# Patient Record
Sex: FEMALE | Race: WHITE | Employment: OTHER | URBAN - NONMETROPOLITAN AREA
[De-identification: names, ages, dates, MRNs, and addresses within clinical notes are randomized per-mention and may not be internally consistent; named-entity substitution may affect disease eponyms.]

---

## 2017-05-01 RX ORDER — OXYBUTYNIN CHLORIDE 5 MG/1
TABLET ORAL
Qty: 60 TABLET | Refills: 5 | Status: SHIPPED | OUTPATIENT
Start: 2017-05-01

## 2017-07-25 ENCOUNTER — OFFICE VISIT (OUTPATIENT)
Dept: PRIMARY CARE CLINIC | Age: 82
End: 2017-07-25
Payer: MEDICARE

## 2017-07-25 DIAGNOSIS — R44.1 VISUAL HALLUCINATIONS: ICD-10-CM

## 2017-07-25 DIAGNOSIS — H35.52 PIGMENTARY RETINAL DYSTROPHY: ICD-10-CM

## 2017-07-25 DIAGNOSIS — I10 ESSENTIAL HYPERTENSION: Primary | ICD-10-CM

## 2017-07-25 PROCEDURE — 99308 SBSQ NF CARE LOW MDM 20: CPT | Performed by: FAMILY MEDICINE

## 2017-07-30 PROBLEM — R44.1 VISUAL HALLUCINATIONS: Status: ACTIVE | Noted: 2017-07-30

## 2017-07-30 PROBLEM — I10 ESSENTIAL HYPERTENSION: Status: ACTIVE | Noted: 2017-07-30

## 2017-07-30 PROBLEM — H35.52 PIGMENTARY RETINAL DYSTROPHY: Status: ACTIVE | Noted: 2017-07-30

## 2017-07-30 PROBLEM — R44.1 VISUAL HALLUCINATION: Status: ACTIVE | Noted: 2017-07-30

## 2017-07-30 ASSESSMENT — ENCOUNTER SYMPTOMS
RESPIRATORY NEGATIVE: 1
GASTROINTESTINAL NEGATIVE: 1

## 2017-11-27 RX ORDER — QUETIAPINE FUMARATE 25 MG/1
TABLET, FILM COATED ORAL
Qty: 15 TABLET | Refills: 5 | Status: SHIPPED | OUTPATIENT
Start: 2017-11-27

## 2018-01-05 RX ORDER — GABAPENTIN 300 MG/1
CAPSULE ORAL
Qty: 90 CAPSULE | Refills: 1 | Status: SHIPPED | OUTPATIENT
Start: 2018-01-05 | End: 2018-03-06 | Stop reason: SDUPTHER

## 2018-02-05 RX ORDER — MULTIVITAMIN/IRON/FOLIC ACID 18MG-0.4MG
TABLET ORAL
Qty: 30 TABLET | Refills: 5 | Status: SHIPPED | OUTPATIENT
Start: 2018-02-05

## 2018-02-06 ENCOUNTER — TELEPHONE (OUTPATIENT)
Dept: UROLOGY | Facility: CLINIC | Age: 83
End: 2018-02-06

## 2018-02-19 RX ORDER — LISINOPRIL 20 MG/1
TABLET ORAL
Qty: 30 TABLET | Refills: 1 | Status: SHIPPED | OUTPATIENT
Start: 2018-02-19 | End: 2020-01-20

## 2018-03-22 ENCOUNTER — LAB REQUISITION (OUTPATIENT)
Dept: LAB | Facility: HOSPITAL | Age: 83
End: 2018-03-22

## 2018-03-22 DIAGNOSIS — Z00.00 ROUTINE GENERAL MEDICAL EXAMINATION AT A HEALTH CARE FACILITY: ICD-10-CM

## 2018-03-22 LAB
FLUAV AG NPH QL: NEGATIVE
FLUBV AG NPH QL IA: NEGATIVE

## 2018-03-22 PROCEDURE — 87804 INFLUENZA ASSAY W/OPTIC: CPT

## 2019-01-07 RX ORDER — MIRABEGRON 50 MG/1
TABLET, FILM COATED, EXTENDED RELEASE ORAL
Qty: 30 TABLET | Refills: 2 | Status: SHIPPED | OUTPATIENT
Start: 2019-01-07

## 2019-03-06 ENCOUNTER — TELEPHONE (OUTPATIENT)
Dept: PRIMARY CARE CLINIC | Age: 84
End: 2019-03-06

## 2019-06-24 RX ORDER — ESOMEPRAZOLE MAGNESIUM 40 MG/1
CAPSULE, DELAYED RELEASE ORAL
Qty: 30 CAPSULE | Refills: 3 | Status: SHIPPED | OUTPATIENT
Start: 2019-06-24

## 2019-11-08 ENCOUNTER — TELEPHONE (OUTPATIENT)
Dept: PRIMARY CARE CLINIC | Age: 84
End: 2019-11-08

## 2019-11-08 RX ORDER — BENZONATATE 100 MG/1
100 CAPSULE ORAL 3 TIMES DAILY PRN
Qty: 30 CAPSULE | Refills: 0 | Status: SHIPPED | OUTPATIENT
Start: 2019-11-08 | End: 2019-11-15

## 2020-01-06 RX ORDER — SENNOSIDES 8.6 MG
TABLET ORAL
Qty: 30 TABLET | Refills: 5 | Status: SHIPPED | OUTPATIENT
Start: 2020-01-06

## 2020-01-20 RX ORDER — LISINOPRIL 20 MG/1
TABLET ORAL
Qty: 30 TABLET | Refills: 1 | Status: SHIPPED | OUTPATIENT
Start: 2020-01-20

## 2020-02-13 ENCOUNTER — HOSPITAL ENCOUNTER (OUTPATIENT)
Dept: CT IMAGING | Age: 85
Discharge: HOME | End: 2020-02-13
Payer: MEDICARE

## 2020-02-13 LAB
GFR NON-AFRICAN AMERICAN: 59
PERFORMED ON: ABNORMAL
POC CREATININE: 0.9 MG/DL (ref 0.3–1.3)
POC SAMPLE TYPE: ABNORMAL

## 2020-02-13 PROCEDURE — 82565 ASSAY OF CREATININE: CPT

## 2020-02-13 PROCEDURE — 6360000004 HC RX CONTRAST MEDICATION: Performed by: FAMILY MEDICINE

## 2020-02-13 PROCEDURE — 74160 CT ABDOMEN W/CONTRAST: CPT

## 2020-02-13 RX ADMIN — IOPAMIDOL 90 ML: 755 INJECTION, SOLUTION INTRAVENOUS at 12:09

## 2020-02-19 ENCOUNTER — TELEPHONE (OUTPATIENT)
Dept: PRIMARY CARE CLINIC | Age: 85
End: 2020-02-19

## 2020-05-19 ENCOUNTER — APPOINTMENT (OUTPATIENT)
Dept: CT IMAGING | Facility: HOSPITAL | Age: 85
End: 2020-05-19

## 2020-05-19 ENCOUNTER — HOSPITAL ENCOUNTER (INPATIENT)
Facility: HOSPITAL | Age: 85
LOS: 3 days | Discharge: NURSING, REHAB OR LONG-TERM CARE | End: 2020-05-22
Attending: EMERGENCY MEDICINE | Admitting: INTERNAL MEDICINE

## 2020-05-19 DIAGNOSIS — N17.9 AKI (ACUTE KIDNEY INJURY) (HCC): ICD-10-CM

## 2020-05-19 DIAGNOSIS — R55 NEAR SYNCOPE: Primary | ICD-10-CM

## 2020-05-19 DIAGNOSIS — K62.89 PROCTITIS: ICD-10-CM

## 2020-05-19 DIAGNOSIS — D64.9 ANEMIA, UNSPECIFIED TYPE: ICD-10-CM

## 2020-05-19 DIAGNOSIS — N39.0 ACUTE UTI (URINARY TRACT INFECTION): ICD-10-CM

## 2020-05-19 PROBLEM — E87.20 LACTIC ACIDOSIS: Status: ACTIVE | Noted: 2020-05-19

## 2020-05-19 PROBLEM — A49.9 BACTERIAL UTI: Status: ACTIVE | Noted: 2020-05-19

## 2020-05-19 PROBLEM — R11.2 NAUSEA WITH VOMITING: Status: ACTIVE | Noted: 2020-05-19

## 2020-05-19 PROBLEM — E86.9 VOLUME DEPLETION: Status: ACTIVE | Noted: 2020-05-19

## 2020-05-19 PROBLEM — I10 ESSENTIAL HYPERTENSION: Status: ACTIVE | Noted: 2020-05-19

## 2020-05-19 PROBLEM — H54.8 LEGAL BLINDNESS: Status: ACTIVE | Noted: 2020-05-19

## 2020-05-19 PROBLEM — F03.90 DEMENTIA (HCC): Status: ACTIVE | Noted: 2020-05-19

## 2020-05-19 LAB
ALBUMIN SERPL-MCNC: 3.8 G/DL (ref 3.5–5.2)
ALBUMIN/GLOB SERPL: 1.2 G/DL
ALP SERPL-CCNC: 72 U/L (ref 39–117)
ALT SERPL W P-5'-P-CCNC: 7 U/L (ref 1–33)
ANION GAP SERPL CALCULATED.3IONS-SCNC: 14 MMOL/L (ref 5–15)
AST SERPL-CCNC: 15 U/L (ref 1–32)
BACTERIA UR QL AUTO: ABNORMAL /HPF
BASOPHILS # BLD AUTO: 0.02 10*3/MM3 (ref 0–0.2)
BASOPHILS NFR BLD AUTO: 0.3 % (ref 0–1.5)
BILIRUB SERPL-MCNC: 0.3 MG/DL (ref 0.2–1.2)
BILIRUB UR QL STRIP: NEGATIVE
BUN BLD-MCNC: 38 MG/DL (ref 8–23)
BUN/CREAT SERPL: 23.5 (ref 7–25)
CALCIUM SPEC-SCNC: 9 MG/DL (ref 8.2–9.6)
CHLORIDE SERPL-SCNC: 100 MMOL/L (ref 98–107)
CLARITY UR: CLEAR
CO2 SERPL-SCNC: 21 MMOL/L (ref 22–29)
COLOR UR: YELLOW
CREAT BLD-MCNC: 1.62 MG/DL (ref 0.57–1)
D-LACTATE SERPL-SCNC: 1.4 MMOL/L (ref 0.5–2)
D-LACTATE SERPL-SCNC: 2.1 MMOL/L (ref 0.5–2)
DEPRECATED RDW RBC AUTO: 54 FL (ref 37–54)
EOSINOPHIL # BLD AUTO: 0.17 10*3/MM3 (ref 0–0.4)
EOSINOPHIL NFR BLD AUTO: 2.9 % (ref 0.3–6.2)
ERYTHROCYTE [DISTWIDTH] IN BLOOD BY AUTOMATED COUNT: 15.7 % (ref 12.3–15.4)
FERRITIN SERPL-MCNC: 179.3 NG/ML (ref 13–150)
GFR SERPL CREATININE-BSD FRML MDRD: 30 ML/MIN/1.73
GLOBULIN UR ELPH-MCNC: 3.3 GM/DL
GLUCOSE BLD-MCNC: 118 MG/DL (ref 65–99)
GLUCOSE UR STRIP-MCNC: NEGATIVE MG/DL
HCT VFR BLD AUTO: 27.5 % (ref 34–46.6)
HGB BLD-MCNC: 9.1 G/DL (ref 12–15.9)
HGB UR QL STRIP.AUTO: ABNORMAL
HOLD SPECIMEN: NORMAL
HYALINE CASTS UR QL AUTO: ABNORMAL /LPF
IMM GRANULOCYTES # BLD AUTO: 0.02 10*3/MM3 (ref 0–0.05)
IMM GRANULOCYTES NFR BLD AUTO: 0.3 % (ref 0–0.5)
IRON 24H UR-MRATE: 61 MCG/DL (ref 37–145)
IRON SATN MFR SERPL: 22 % (ref 20–50)
KETONES UR QL STRIP: NEGATIVE
LACTATE HOLD SPECIMEN: NORMAL
LEUKOCYTE ESTERASE UR QL STRIP.AUTO: ABNORMAL
LIPASE SERPL-CCNC: 53 U/L (ref 13–60)
LYMPHOCYTES # BLD AUTO: 1.26 10*3/MM3 (ref 0.7–3.1)
LYMPHOCYTES NFR BLD AUTO: 21.6 % (ref 19.6–45.3)
MCH RBC QN AUTO: 30.8 PG (ref 26.6–33)
MCHC RBC AUTO-ENTMCNC: 33.1 G/DL (ref 31.5–35.7)
MCV RBC AUTO: 93.2 FL (ref 79–97)
MONOCYTES # BLD AUTO: 0.49 10*3/MM3 (ref 0.1–0.9)
MONOCYTES NFR BLD AUTO: 8.4 % (ref 5–12)
NEUTROPHILS # BLD AUTO: 3.88 10*3/MM3 (ref 1.7–7)
NEUTROPHILS NFR BLD AUTO: 66.5 % (ref 42.7–76)
NITRITE UR QL STRIP: NEGATIVE
NRBC BLD AUTO-RTO: 0 /100 WBC (ref 0–0.2)
PH UR STRIP.AUTO: 6 [PH] (ref 5–8)
PLATELET # BLD AUTO: 202 10*3/MM3 (ref 140–450)
PMV BLD AUTO: 11 FL (ref 6–12)
POTASSIUM BLD-SCNC: 4 MMOL/L (ref 3.5–5.2)
PROCALCITONIN SERPL-MCNC: 0.07 NG/ML (ref 0.1–0.25)
PROT SERPL-MCNC: 7.1 G/DL (ref 6–8.5)
PROT UR QL STRIP: NEGATIVE
RBC # BLD AUTO: 2.95 10*6/MM3 (ref 3.77–5.28)
RBC # UR: ABNORMAL /HPF
REF LAB TEST METHOD: ABNORMAL
SODIUM BLD-SCNC: 135 MMOL/L (ref 136–145)
SP GR UR STRIP: 1.01 (ref 1–1.03)
SQUAMOUS #/AREA URNS HPF: ABNORMAL /HPF
TIBC SERPL-MCNC: 273 MCG/DL (ref 298–536)
TRANSFERRIN SERPL-MCNC: 183 MG/DL (ref 200–360)
TROPONIN T SERPL-MCNC: <0.01 NG/ML (ref 0–0.03)
UROBILINOGEN UR QL STRIP: ABNORMAL
WBC NRBC COR # BLD: 5.84 10*3/MM3 (ref 3.4–10.8)
WBC UR QL AUTO: ABNORMAL /HPF
WHOLE BLOOD HOLD SPECIMEN: NORMAL
WHOLE BLOOD HOLD SPECIMEN: NORMAL

## 2020-05-19 PROCEDURE — 84484 ASSAY OF TROPONIN QUANT: CPT | Performed by: EMERGENCY MEDICINE

## 2020-05-19 PROCEDURE — 84466 ASSAY OF TRANSFERRIN: CPT | Performed by: INTERNAL MEDICINE

## 2020-05-19 PROCEDURE — 82607 VITAMIN B-12: CPT | Performed by: INTERNAL MEDICINE

## 2020-05-19 PROCEDURE — 83690 ASSAY OF LIPASE: CPT | Performed by: EMERGENCY MEDICINE

## 2020-05-19 PROCEDURE — 83540 ASSAY OF IRON: CPT | Performed by: INTERNAL MEDICINE

## 2020-05-19 PROCEDURE — 83605 ASSAY OF LACTIC ACID: CPT | Performed by: EMERGENCY MEDICINE

## 2020-05-19 PROCEDURE — 99284 EMERGENCY DEPT VISIT MOD MDM: CPT

## 2020-05-19 PROCEDURE — 87040 BLOOD CULTURE FOR BACTERIA: CPT | Performed by: EMERGENCY MEDICINE

## 2020-05-19 PROCEDURE — 87147 CULTURE TYPE IMMUNOLOGIC: CPT | Performed by: EMERGENCY MEDICINE

## 2020-05-19 PROCEDURE — P9612 CATHETERIZE FOR URINE SPEC: HCPCS

## 2020-05-19 PROCEDURE — 85025 COMPLETE CBC W/AUTO DIFF WBC: CPT | Performed by: EMERGENCY MEDICINE

## 2020-05-19 PROCEDURE — 74176 CT ABD & PELVIS W/O CONTRAST: CPT

## 2020-05-19 PROCEDURE — 25010000002 ERTAPENEM PER 500 MG: Performed by: EMERGENCY MEDICINE

## 2020-05-19 PROCEDURE — 82728 ASSAY OF FERRITIN: CPT | Performed by: INTERNAL MEDICINE

## 2020-05-19 PROCEDURE — 80053 COMPREHEN METABOLIC PANEL: CPT | Performed by: EMERGENCY MEDICINE

## 2020-05-19 PROCEDURE — 93005 ELECTROCARDIOGRAM TRACING: CPT | Performed by: EMERGENCY MEDICINE

## 2020-05-19 PROCEDURE — 87086 URINE CULTURE/COLONY COUNT: CPT | Performed by: EMERGENCY MEDICINE

## 2020-05-19 PROCEDURE — 84145 PROCALCITONIN (PCT): CPT | Performed by: EMERGENCY MEDICINE

## 2020-05-19 PROCEDURE — 93010 ELECTROCARDIOGRAM REPORT: CPT | Performed by: INTERNAL MEDICINE

## 2020-05-19 PROCEDURE — 81001 URINALYSIS AUTO W/SCOPE: CPT | Performed by: EMERGENCY MEDICINE

## 2020-05-19 RX ORDER — ONDANSETRON 2 MG/ML
4 INJECTION INTRAMUSCULAR; INTRAVENOUS EVERY 6 HOURS PRN
Status: DISCONTINUED | OUTPATIENT
Start: 2020-05-19 | End: 2020-05-22 | Stop reason: HOSPADM

## 2020-05-19 RX ORDER — SODIUM CHLORIDE 0.9 % (FLUSH) 0.9 %
10 SYRINGE (ML) INJECTION EVERY 12 HOURS SCHEDULED
Status: DISCONTINUED | OUTPATIENT
Start: 2020-05-19 | End: 2020-05-22 | Stop reason: HOSPADM

## 2020-05-19 RX ORDER — METOPROLOL SUCCINATE 100 MG/1
100 TABLET, EXTENDED RELEASE ORAL DAILY
Status: DISCONTINUED | OUTPATIENT
Start: 2020-05-19 | End: 2020-05-22 | Stop reason: HOSPADM

## 2020-05-19 RX ORDER — OXYBUTYNIN CHLORIDE 5 MG/1
10 TABLET, EXTENDED RELEASE ORAL DAILY
Status: DISCONTINUED | OUTPATIENT
Start: 2020-05-19 | End: 2020-05-22 | Stop reason: HOSPADM

## 2020-05-19 RX ORDER — METOPROLOL SUCCINATE 100 MG/1
100 TABLET, EXTENDED RELEASE ORAL DAILY
COMMUNITY

## 2020-05-19 RX ORDER — ATORVASTATIN CALCIUM 10 MG/1
10 TABLET, FILM COATED ORAL DAILY
COMMUNITY

## 2020-05-19 RX ORDER — ACETAMINOPHEN 650 MG/1
650 SUPPOSITORY RECTAL EVERY 4 HOURS PRN
Status: DISCONTINUED | OUTPATIENT
Start: 2020-05-19 | End: 2020-05-22 | Stop reason: HOSPADM

## 2020-05-19 RX ORDER — SODIUM CHLORIDE 9 MG/ML
50 INJECTION, SOLUTION INTRAVENOUS CONTINUOUS
Status: DISCONTINUED | OUTPATIENT
Start: 2020-05-19 | End: 2020-05-21

## 2020-05-19 RX ORDER — ATORVASTATIN CALCIUM 10 MG/1
10 TABLET, FILM COATED ORAL NIGHTLY
Status: DISCONTINUED | OUTPATIENT
Start: 2020-05-19 | End: 2020-05-22 | Stop reason: HOSPADM

## 2020-05-19 RX ORDER — AMINO ACIDS/PROTEIN HYDROLYS 15G-100/30
30 LIQUID (ML) ORAL 2 TIMES DAILY
COMMUNITY

## 2020-05-19 RX ORDER — PANTOPRAZOLE SODIUM 40 MG/1
40 TABLET, DELAYED RELEASE ORAL EVERY MORNING
Status: DISCONTINUED | OUTPATIENT
Start: 2020-05-20 | End: 2020-05-22 | Stop reason: HOSPADM

## 2020-05-19 RX ORDER — ACETAMINOPHEN 160 MG/5ML
650 SOLUTION ORAL EVERY 4 HOURS PRN
Status: DISCONTINUED | OUTPATIENT
Start: 2020-05-19 | End: 2020-05-22 | Stop reason: HOSPADM

## 2020-05-19 RX ORDER — HYDROCHLOROTHIAZIDE 12.5 MG/1
12.5 CAPSULE, GELATIN COATED ORAL DAILY
COMMUNITY
End: 2020-05-22 | Stop reason: HOSPADM

## 2020-05-19 RX ORDER — LISINOPRIL 20 MG/1
20 TABLET ORAL DAILY
COMMUNITY

## 2020-05-19 RX ORDER — SODIUM CHLORIDE 0.9 % (FLUSH) 0.9 %
10 SYRINGE (ML) INJECTION AS NEEDED
Status: DISCONTINUED | OUTPATIENT
Start: 2020-05-19 | End: 2020-05-22 | Stop reason: HOSPADM

## 2020-05-19 RX ORDER — ACETAMINOPHEN 325 MG/1
650 TABLET ORAL EVERY 4 HOURS PRN
Status: DISCONTINUED | OUTPATIENT
Start: 2020-05-19 | End: 2020-05-22 | Stop reason: HOSPADM

## 2020-05-19 RX ORDER — QUETIAPINE FUMARATE 25 MG/1
25 TABLET, FILM COATED ORAL 3 TIMES DAILY
COMMUNITY

## 2020-05-19 RX ORDER — QUETIAPINE FUMARATE 25 MG/1
25 TABLET, FILM COATED ORAL 3 TIMES DAILY
Status: DISCONTINUED | OUTPATIENT
Start: 2020-05-19 | End: 2020-05-22 | Stop reason: HOSPADM

## 2020-05-19 RX ADMIN — SODIUM CHLORIDE 100 ML/HR: 9 INJECTION, SOLUTION INTRAVENOUS at 16:49

## 2020-05-19 RX ADMIN — POLYETHYLENE GLYCOL (3350) 17 G: 17 POWDER, FOR SOLUTION ORAL at 17:57

## 2020-05-19 RX ADMIN — ATORVASTATIN CALCIUM 10 MG: 10 TABLET, FILM COATED ORAL at 22:40

## 2020-05-19 RX ADMIN — OXYBUTYNIN CHLORIDE 10 MG: 5 TABLET, EXTENDED RELEASE ORAL at 16:49

## 2020-05-19 RX ADMIN — SODIUM CHLORIDE, POTASSIUM CHLORIDE, SODIUM LACTATE AND CALCIUM CHLORIDE 500 ML: 600; 310; 30; 20 INJECTION, SOLUTION INTRAVENOUS at 12:10

## 2020-05-19 RX ADMIN — SODIUM CHLORIDE 1 G: 900 INJECTION INTRAVENOUS at 13:26

## 2020-05-19 RX ADMIN — QUETIAPINE FUMARATE 25 MG: 25 TABLET ORAL at 22:40

## 2020-05-19 RX ADMIN — QUETIAPINE FUMARATE 25 MG: 25 TABLET ORAL at 16:50

## 2020-05-19 RX ADMIN — METOPROLOL SUCCINATE 100 MG: 100 TABLET, EXTENDED RELEASE ORAL at 16:49

## 2020-05-19 NOTE — ED PROVIDER NOTES
Subjective   Patient was in a nursing home there is a confusion that she had a syncopal episode did not fall down questionable CPR EMS as there was no CPR patient is confused she is legally blind has abdominal pain and tenderness      Nausea   The primary symptoms include nausea and vomiting. Primary symptoms do not include fever, weight loss, fatigue, diarrhea, jaundice, hematochezia, myalgias or arthralgias. The illness began 2 days ago. The onset was gradual.   The illness does not include chills or back pain. Associated medical issues do not include gallstones, liver disease or irritable bowel syndrome.   Vomiting   The primary symptoms include nausea and vomiting. Primary symptoms do not include fever, weight loss, fatigue, diarrhea, jaundice, hematochezia, myalgias or arthralgias.   The illness does not include chills or back pain. Associated medical issues do not include gallstones, liver disease or irritable bowel syndrome.       Review of Systems   Unable to perform ROS: Dementia   Constitutional: Negative.  Negative for chills, fatigue, fever and weight loss.   HENT: Negative.    Eyes: Negative.    Respiratory: Negative.    Cardiovascular: Negative.    Gastrointestinal: Positive for nausea and vomiting. Negative for diarrhea, hematochezia and jaundice.   Musculoskeletal: Negative.  Negative for arthralgias, back pain, myalgias and neck pain.   Skin: Negative.    Neurological: Negative.    All other systems reviewed and are negative.      No past medical history on file.    Allergies   Allergen Reactions   • Ampicillin Unknown - High Severity       Past Surgical History:   Procedure Laterality Date   • BREAST BIOPSY Right 1970   • HYSTERECTOMY         Family History   Problem Relation Age of Onset   • Breast cancer Maternal Aunt        Social History     Socioeconomic History   • Marital status:      Spouse name: Not on file   • Number of children: Not on file   • Years of education: Not on file   •  Highest education level: Not on file           Objective   Physical Exam   Constitutional: She appears well-developed and well-nourished.   HENT:   Head: Normocephalic and atraumatic.   Eyes: Pupils are equal, round, and reactive to light. EOM are normal.   Neck: Normal range of motion. Neck supple.   Cardiovascular: Normal rate and regular rhythm.   Pulmonary/Chest: Effort normal and breath sounds normal.   Abdominal: Soft. Bowel sounds are normal. There is tenderness.   Musculoskeletal: Normal range of motion.   Neurological: She is alert. She has normal reflexes. She is disoriented.   She is neuro exam is difficult to certain she is legally blind and has dementia does not follow commands has got flexion contractures lower extremities.  Is disoriented could be baseline   Skin: Skin is warm and dry.   Pale and clammy skin   Psychiatric: She has a normal mood and affect.   Nursing note and vitals reviewed.      Procedures           ED Course  ED Course as of May 19 1352   Tue May 19, 2020   1331 This patient came in with nausea and vomiting there is no history of passing out or getting CPR she did felt like she was going to faint per the people on site and she was laid down on the couch.  Her work-up essentially is negative except for proctitis and renal insufficiency she does have a UTI    [TS]   1332 Hemoglobin is on the lower side the nurses were able to discuss this case with her daughter as far as mental status is concerned this is baseline.  Her bicarb is low probably is RTA    [TS]   1344 Patient with vomiting abdominal discomfort has got proctitis renal insufficiency with new onset anemia and a UTI has received IV antibiotics will be admitted to the hospitalist service    [TS]   1347 Comprehensive Metabolic Panel(!) [TS]      ED Course User Index  [TS] Neal Morris MD                                           [facility]  Number of Diagnoses or Management Options  Diagnosis management comments: Patient with  abdominal discomfort tenderness on palpation nausea and vomiting also been near syncopal episode       Amount and/or Complexity of Data Reviewed  Clinical lab tests: ordered and reviewed  Tests in the radiology section of CPT®: ordered and reviewed  Tests in the medicine section of CPT®: reviewed and ordered    Risk of Complications, Morbidity, and/or Mortality  Presenting problems: moderate  Diagnostic procedures: moderate  Management options: moderate        Final diagnoses:   Near syncope   Acute UTI (urinary tract infection)   JAZZMINE (acute kidney injury) (CMS/[facility])   Anemia, unspecified type   Proctitis            Neal Morris MD  05/19/20 7487

## 2020-05-19 NOTE — H&P
"    [facility] Medicine Services  HISTORY AND PHYSICAL    Date of Admission: 5/19/2020  Primary Care Physician: Nunu Finch MD    Subjective     Chief Complaint: Weakness, near syncope.    History of Present Illness  This is a 89-year-old  female patient who resides at [facility].  Her primary care physician is Dr. Nunu Finch.  She was referred to the emergency department this morning after having an episode of weakness and a near syncopal episode at the skilled nursing facility.  She apparently has also been complaining of some nausea/vomiting and generalized abdominal pain.  She states that the near syncopal episode was yesterday and denies any problems with nausea/vomiting or abdominal pain.    Work-up in the emergency department finds her to have possible proctitis versus stercoral colitis.  She also has evidence of urinary tract infection.  She is in acute renal failure based on previous labs.  She is being admitted for IV antibiotics and hydration with close monitoring.    She has a history of dementia with behavioral disturbance on Seroquel.  She is also legally blind.    Her review of systems is very difficult.  She seems to want to be left alone. She tells me that she has \"answer none of questions today.\"  She wants something to drink.    Review of Systems   Difficult with her dementia.    Past Medical History:   Past Medical History:   Diagnosis Date   • Hyperlipidemia    • Hypertension      Past Surgical History:  Past Surgical History:   Procedure Laterality Date   • BREAST BIOPSY Right 1970   • HYSTERECTOMY       Social History: Lives at a skilled nursing facility in [place].  Does not smoke or drink alcohol.    Family History: family history includes Breast cancer in her maternal aunt.     Allergies:  Allergies   Allergen Reactions   • Ampicillin Unknown - High Severity     Medications:  Prior to Admission medications    " "  Medication Sig Start Date End Date Taking? Authorizing Provider   atorvastatin (LIPITOR) 10 MG tablet Take 10 mg by mouth Daily.   Yes Sergio Damon MD   Cranberry-Vitamin C-Inulin (UTI-STAT) liquid Take 30 mL by mouth 2 (Two) Times a Day.   Yes Sergio Damon MD   esomeprazole (nexIUM) 20 MG capsule Take 20 mg by mouth Every Morning Before Breakfast.   Yes Sergio Damon MD   hydroCHLOROthiazide (MICROZIDE) 12.5 MG capsule Take 12.5 mg by mouth Daily.   Yes Sergio Damon MD   lisinopril (PRINIVIL,ZESTRIL) 20 MG tablet Take 20 mg by mouth Daily.   Yes Sergio Damon MD   metoprolol succinate XL (TOPROL-XL) 100 MG 24 hr tablet Take 100 mg by mouth Daily.   Yes Sergio Damon MD   Mirabegron ER (MYRBETRIQ) 50 MG tablet sustained-release 24 hour 24 hr tablet Take 50 mg by mouth Daily.   Yes Sergio Damon MD   QUEtiapine (SEROquel) 25 MG tablet Take 25 mg by mouth 3 (Three) Times a Day.   Yes Sergio Damon MD     Objective     Vital Signs: /56   Pulse 59   Temp 97.5 °F (36.4 °C) (Oral)   Resp 16   Ht 157.5 cm (62\")   Wt 67.1 kg (148 lb)   SpO2 96%   BMI 27.07 kg/m²   Physical Exam   Constitutional: She appears well-developed and well-nourished.   Up in bed.  No distress.  Nontoxic.  No family present.  Discussed with her nurse, Claire.   HENT:   Head: Normocephalic and atraumatic.   Eyes: Pupils are equal, round, and reactive to light. Conjunctivae are normal.   Neck: Neck supple. No JVD present.   Cardiovascular: Normal rate, regular rhythm, normal heart sounds and intact distal pulses. Exam reveals no gallop and no friction rub.   No murmur heard.  Pulmonary/Chest: Effort normal and breath sounds normal. No respiratory distress. She has no wheezes. She has no rales. She exhibits no tenderness.   Abdominal: Soft. Bowel sounds are normal. She exhibits no distension. There is no tenderness. There is no rebound and no guarding.   Musculoskeletal: " Normal range of motion. She exhibits no edema, tenderness or deformity.   Neurological: She is alert. She displays normal reflexes. No cranial nerve deficit. She exhibits normal muscle tone.   Oriented to self and situation only.  Generally weak, but nonfocal.   Skin: Skin is warm and dry. No rash noted.   Psychiatric:   Flat.  Does not want to see an answer questions.     Results Reviewed:  Lab Results (last 24 hours)     Procedure Component Value Units Date/Time    Blood Culture - Blood, Arm, Left [[id number]] Collected:  05/19/20 1125    Specimen:  Blood from Arm, Left Updated:  05/19/20 1335    Blood Culture - Blood, Arm, Right [[id number]] Collected:  05/19/20 1155    Specimen:  Blood from Arm, Right Updated:  05/19/20 1302    Urinalysis, Microscopic Only - Urine, Catheter In/Out [[id number]]  (Abnormal) Collected:  05/19/20 1158    Specimen:  Urine, Catheter In/Out Updated:  05/19/20 1236     RBC, UA 6-12 /HPF      WBC, UA 31-50 /HPF      Bacteria, UA 2+ /HPF      Squamous Epithelial Cells, UA 7-12 /HPF      Hyaline Casts, UA None Seen /LPF      Methodology Automated Microscopy    Urine Culture - Urine, Urine, Catheter In/Out [[id number]] Collected:  05/19/20 1158    Specimen:  Urine, Catheter In/Out Updated:  05/19/20 1236    [place] Draw [[id number]] Collected:  05/19/20 1125    Specimen:  Blood Updated:  05/19/20 1230    Narrative:       The following orders were created for panel order [place] Draw.  Procedure                               Abnormality         Status                     ---------                               -----------         ------                     Light Blue Top[[id number]]                                    Final result               Green Top (Gel)[[id number]]                                   Final result               Lavender Top[[id number]]                                      Final result               Red Top[[id number]]                                           Final result                  [facility] Blood Culture Bot...[[id number]]                      Final result               Gray Top - Ice[[id number]]                                    Final result                 Please view results for these tests on the individual orders.    Light Blue Top [[id number]] Collected:  05/19/20 1125    Specimen:  Blood Updated:  05/19/20 1230     Extra Tube hold for add-on     Comment: Auto resulted       Green Top (Gel) [[id number]] Collected:  05/19/20 1125    Specimen:  Blood Updated:  05/19/20 1230     Extra Tube Hold for add-ons.     Comment: Auto resulted.       Lavender Top [[id number]] Collected:  05/19/20 1125    Specimen:  Blood Updated:  05/19/20 1230     Extra Tube hold for add-on     Comment: Auto resulted       Red Top [[id number]] Collected:  05/19/20 1125    Specimen:  Blood Updated:  05/19/20 1230     Extra Tube Hold for add-ons.     Comment: Auto resulted.       [employer] Blood Culture Bottle Set [[id number]] Collected:  05/19/20 1125    Specimen:  Blood from Arm, Left Updated:  05/19/20 1230     Extra Tube Hold for add-ons.     Comment: Auto resulted.       Urinalysis With Culture If Indicated - Urine, Catheter In/Out [[id number]]  (Abnormal) Collected:  05/19/20 1158    Specimen:  Urine, Catheter In/Out Updated:  05/19/20 1219     Color, UA Yellow     Appearance, UA Clear     pH, UA 6.0     Specific Gravity, UA 1.015     Glucose, UA Negative     Ketones, UA Negative     Bilirubin, UA Negative     Blood, UA Moderate (2+)     Protein, UA Negative     Leuk Esterase, UA Large (3+)     Nitrite, UA Negative     Urobilinogen, UA 0.2 E.U./dL    Lactic Acid, Plasma [[id number]]  (Abnormal) Collected:  05/19/20 1125    Specimen:  Blood Updated:  05/19/20 1215     Lactate 2.1 mmol/L     Lactic Acid, Reflex Timer (This will reflex a repeat order 3-3:15 hours after ordered.) [[id number]] Collected:  05/19/20 1125    Specimen:  Blood Updated:  05/19/20 1215    Procalcitonin [[id number]]  (Abnormal) Collected:  05/19/20 1125     "Specimen:  Blood Updated:  05/19/20 1204     Procalcitonin 0.07 ng/mL     Narrative:       As a Marker for Sepsis (Non-Neonates):   1. <0.5 ng/mL represents a low risk of severe sepsis and/or septic shock.  1. >2 ng/mL represents a high risk of severe sepsis and/or septic shock.    As a Marker for Lower Respiratory Tract Infections that require antibiotic therapy:  PCT on Admission     Antibiotic Therapy             6-12 Hrs later  > 0.5                Strongly Recommended            >0.25 - <0.5         Recommended  0.1 - 0.25           Discouraged                   Remeasure/reassess PCT  <0.1                 Strongly Discouraged          Remeasure/reassess PCT      As 28 day mortality risk marker: \"Change in Procalcitonin Result\" (> 80 % or <=80 %) if Day 0 (or Day 1) and Day 4 values are available. Refer to http://www.Zondlepct-calculator.[employer]/   Change in PCT <=80 %   A decrease of PCT levels below or equal to 80 % defines a positive change in PCT test result representing a higher risk for 28-day all-cause mortality of patients diagnosed with severe sepsis or septic shock.  Change in PCT > 80 %   A decrease of PCT levels of more than 80 % defines a negative change in PCT result representing a lower risk for 28-day all-cause mortality of patients diagnosed with severe sepsis or septic shock.                Results may be falsely decreased if patient taking Biotin.     Comprehensive Metabolic Panel [[id number]]  (Abnormal) Collected:  05/19/20 1125    Specimen:  Blood Updated:  05/19/20 1201     Glucose 118 mg/dL      BUN 38 mg/dL      Creatinine 1.62 mg/dL      Sodium 135 mmol/L      Potassium 4.0 mmol/L      Chloride 100 mmol/L      CO2 21.0 mmol/L      Calcium 9.0 mg/dL      Total Protein 7.1 g/dL      Albumin 3.80 g/dL      ALT (SGPT) 7 U/L      AST (SGOT) 15 U/L      Alkaline Phosphatase 72 U/L      Total Bilirubin 0.3 mg/dL      eGFR Non African Amer 30 mL/min/1.73      Globulin 3.3 gm/dL      A/G Ratio 1.2 " g/dL      BUN/Creatinine Ratio 23.5     Anion Gap 14.0 mmol/L     Narrative:       GFR Normal >60  Chronic Kidney Disease <60  Kidney Failure <15      Gray Top - Ice [[id number]] Collected:  05/19/20 1125    Specimen:  Blood Updated:  05/19/20 1158     Extra Tube --    Lipase [[id number]]  (Normal) Collected:  05/19/20 1125    Specimen:  Blood Updated:  05/19/20 1156     Lipase 53 U/L     Troponin [[id number]]  (Normal) Collected:  05/19/20 1125    Specimen:  Blood Updated:  05/19/20 1156     Troponin T <0.010 ng/mL     Narrative:       Troponin T Reference Range:  <= 0.03 ng/mL-   Negative for AMI  >0.03 ng/mL-     Abnormal for myocardial necrosis.  Clinicians would have to utilize clinical acumen, EKG, Troponin and serial changes to determine if it is an Acute Myocardial Infarction or myocardial injury due to an underlying chronic condition.       Results may be falsely decreased if patient taking Biotin.      CBC & Differential [[id number]] Collected:  05/19/20 1125    Specimen:  Blood Updated:  05/19/20 1140    Narrative:       The following orders were created for panel order CBC & Differential.  Procedure                               Abnormality         Status                     ---------                               -----------         ------                     CBC Auto Differential[[id number]]        Abnormal            Final result                 Please view results for these tests on the individual orders.    CBC Auto Differential [[id number]]  (Abnormal) Collected:  05/19/20 1125    Specimen:  Blood Updated:  05/19/20 1140     WBC 5.84 10*3/mm3      RBC 2.95 10*6/mm3      Hemoglobin 9.1 g/dL      Hematocrit 27.5 %      MCV 93.2 fL      MCH 30.8 pg      MCHC 33.1 g/dL      RDW 15.7 %      RDW-SD 54.0 fl      MPV 11.0 fL      Platelets 202 10*3/mm3      Neutrophil % 66.5 %      Lymphocyte % 21.6 %      Monocyte % 8.4 %      Eosinophil % 2.9 %      Basophil % 0.3 %      Immature Grans % 0.3 %      Neutrophils,  Absolute 3.88 10*3/mm3      Lymphocytes, Absolute 1.26 10*3/mm3      Monocytes, Absolute 0.49 10*3/mm3      Eosinophils, Absolute 0.17 10*3/mm3      Basophils, Absolute 0.02 10*3/mm3      Immature Grans, Absolute 0.02 10*3/mm3      nRBC 0.0 /100 WBC         Imaging Results (Last 24 Hours)     Procedure Component Value Units Date/Time    CT Abdomen Pelvis Without Contrast [[id number]] Collected:  05/19/20 1216     Updated:  05/19/20 1227    Narrative:       CT ABDOMEN PELVIS WO CONTRAST- 5/19/2020 12:07 PM CDT     HISTORY: Abd pain, fever, abscess suspected      COMPARISON: None      DOSE LENGTH PRODUCT: 230 mGy cm. Automated exposure control was also  utilized to decrease patient radiation dose.     TECHNIQUE: Noncontrast enhanced images of the abdomen and pelvis  obtained without oral contrast. Multiplanar reformatted images were  provided for review.      FINDINGS:   LOWER CHEST: Mild scarring in both bases. No consolidation.      LIVER: No focal liver lesion within limits of a noncontrast study.      BILIARY SYSTEM: The gallbladder has been removed. There is no evidence  of biliary ductal dilation.      PANCREAS: Diffuse atrophy. No obvious focal lesion.      SPLEEN: Normal size.      KIDNEYS: Bilateral kidneys are grossly unremarkable. No hydronephrosis.  The ureters are decompressed and normal in appearance.     ADRENALS: Unremarkable.     RETROPERITONEUM: No mass, lymphadenopathy or hemorrhage.      GI TRACT: Stomach is unremarkable. The small bowel is nondilated. There  appears to be prior appendectomy. The colon is mostly decompressed.  Prominent rectal stool with mild distention. Notable circumferential  wall thickening at the rectum with adjacent inflammatory stranding in  the mesorectal fat. No extraluminal collection/abscess identified.  Underlying sigmoid diverticulosis.     OTHER: No intraperitoneal free fluid or free air. No mesenteric  adenopathy. Rectus diastases. Age indeterminant wedge  compression  deformities at L1 and L2.      PELVIS: No mass lesion, fluid collection or significant lymphadenopathy  is seen in the pelvis. The urinary bladder is normal in appearance.       Impression:       1. Proctitis vs stercoral colitis. Proctitis is favored, as the rectal  is only slightly distended. Associated inflammatory stranding in the  mesorectal fat. No visualized perforation or evidence of bowel  obstruction.  This report was finalized on 05/19/2020 12:24 by Dr Constantin Sanchez, .        I have personally reviewed and interpreted the radiology studies and ECG obtained at time of admission.     Assessment / Plan     Assessment:   Active Hospital Problems    Diagnosis   • **Near syncope   • Nausea with vomiting   • Volume depletion   • Lactic acidosis, slight (2.1)   • Acute renal failure (ARF) (CMS/HCC)   • Proctitis   • Bacterial UTI   • Essential hypertension   • Dementia (CMS/HCC)   • Legal blindness   • Normocytic anemia     Plan:   She will be admitted to my service here about self [place].  She presents to the emergency department today with complaints of a near syncopal episode, weakness, nausea/vomiting, and some generalized abdominal pain.    She appears to have a urinary tract infection based on her urinalysis.  Culture pending.  She received Invanz in the emergency department.  She also has proctitis versus stercoral colitis on CT.  I think it would be fine to continue with Invanz for now.  I do not find her to be septic.  She did have a lactic acid level of 2.1; reflex pending.  She is not tachycardic and has normal blood pressure.  She does not have a leukocytosis.    Her serum creatinine in February 2020 at [facility] was 0.9.  Creatinine today is 1.62.  Continue hydration and monitor.  Hold hydrochlorothiazide lisinopril.    She is anemic with a hemoglobin of 9.1.  The most recent hemoglobin I can find in old records is from April 2016 at that point time it was 11.3.  Check anemia  substrates.    SCDs for DVT prophylaxis.    Code Status: Full code per nursing home records.     I discussed the patient's findings and my recommendations with the patient.     Estimated length of stay is 1-3 days.     Patient seen and examined by me on 05/19/20 at 15:37.    Teddy Tierney,    05/19/20   15:29

## 2020-05-20 LAB
ANION GAP SERPL CALCULATED.3IONS-SCNC: 11 MMOL/L (ref 5–15)
BUN BLD-MCNC: 28 MG/DL (ref 8–23)
BUN/CREAT SERPL: 23.3 (ref 7–25)
CALCIUM SPEC-SCNC: 8.7 MG/DL (ref 8.2–9.6)
CHLORIDE SERPL-SCNC: 107 MMOL/L (ref 98–107)
CO2 SERPL-SCNC: 21 MMOL/L (ref 22–29)
CREAT BLD-MCNC: 1.2 MG/DL (ref 0.57–1)
DEPRECATED RDW RBC AUTO: 53.1 FL (ref 37–54)
ERYTHROCYTE [DISTWIDTH] IN BLOOD BY AUTOMATED COUNT: 15.7 % (ref 12.3–15.4)
GFR SERPL CREATININE-BSD FRML MDRD: 42 ML/MIN/1.73
GLUCOSE BLD-MCNC: 94 MG/DL (ref 65–99)
HCT VFR BLD AUTO: 24.8 % (ref 34–46.6)
HGB BLD-MCNC: 8.3 G/DL (ref 12–15.9)
MCH RBC QN AUTO: 30.7 PG (ref 26.6–33)
MCHC RBC AUTO-ENTMCNC: 33.5 G/DL (ref 31.5–35.7)
MCV RBC AUTO: 91.9 FL (ref 79–97)
PLATELET # BLD AUTO: 190 10*3/MM3 (ref 140–450)
PMV BLD AUTO: 11.2 FL (ref 6–12)
POTASSIUM BLD-SCNC: 4.5 MMOL/L (ref 3.5–5.2)
RBC # BLD AUTO: 2.7 10*6/MM3 (ref 3.77–5.28)
SODIUM BLD-SCNC: 139 MMOL/L (ref 136–145)
VIT B12 BLD-MCNC: 283 PG/ML (ref 211–946)
WBC NRBC COR # BLD: 4.63 10*3/MM3 (ref 3.4–10.8)

## 2020-05-20 PROCEDURE — 25010000002 ERTAPENEM PER 500 MG: Performed by: INTERNAL MEDICINE

## 2020-05-20 PROCEDURE — 80048 BASIC METABOLIC PNL TOTAL CA: CPT | Performed by: INTERNAL MEDICINE

## 2020-05-20 PROCEDURE — 85027 COMPLETE CBC AUTOMATED: CPT | Performed by: INTERNAL MEDICINE

## 2020-05-20 RX ORDER — FERROUS SULFATE 325(65) MG
325 TABLET ORAL
Status: DISCONTINUED | OUTPATIENT
Start: 2020-05-21 | End: 2020-05-22 | Stop reason: HOSPADM

## 2020-05-20 RX ORDER — LISINOPRIL 20 MG/1
20 TABLET ORAL DAILY
Status: DISCONTINUED | OUTPATIENT
Start: 2020-05-20 | End: 2020-05-22 | Stop reason: HOSPADM

## 2020-05-20 RX ORDER — AMLODIPINE BESYLATE 5 MG/1
5 TABLET ORAL
Status: DISCONTINUED | OUTPATIENT
Start: 2020-05-20 | End: 2020-05-22

## 2020-05-20 RX ADMIN — AMLODIPINE BESYLATE 5 MG: 5 TABLET ORAL at 08:27

## 2020-05-20 RX ADMIN — ATORVASTATIN CALCIUM 10 MG: 10 TABLET, FILM COATED ORAL at 21:30

## 2020-05-20 RX ADMIN — OXYBUTYNIN CHLORIDE 10 MG: 5 TABLET, EXTENDED RELEASE ORAL at 08:27

## 2020-05-20 RX ADMIN — QUETIAPINE FUMARATE 25 MG: 25 TABLET ORAL at 08:27

## 2020-05-20 RX ADMIN — LISINOPRIL 20 MG: 20 TABLET ORAL at 08:27

## 2020-05-20 RX ADMIN — METOPROLOL SUCCINATE 100 MG: 100 TABLET, EXTENDED RELEASE ORAL at 08:27

## 2020-05-20 RX ADMIN — QUETIAPINE FUMARATE 25 MG: 25 TABLET ORAL at 21:30

## 2020-05-20 RX ADMIN — SODIUM CHLORIDE 500 MG: 9 INJECTION, SOLUTION INTRAVENOUS at 12:48

## 2020-05-20 RX ADMIN — SODIUM CHLORIDE 100 ML/HR: 9 INJECTION, SOLUTION INTRAVENOUS at 12:48

## 2020-05-20 NOTE — PROGRESS NOTES
Discharge Planning Assessment  [facility]     Patient Name: Vannesa Davis  MRN: [id number]  Today's Date: 5/20/2020    Admit Date: 5/19/2020    Discharge Needs Assessment     Row Name 05/20/20 0919       Living Environment    Lives With  facility resident    Name(s) of Who Lives With Patient  Resides at [facility]    Current Living Arrangements  extended care facility    Primary Care Provided by  other (see comments)    Provides Primary Care For  no one, unable/limited ability to care for self    Able to Return to Prior Arrangements  yes       Resource/Environmental Concerns    Resource/Environmental Concerns  none       Transition Planning    Patient/Family Anticipates Transition to  long term care facility       Discharge Needs Assessment    Outpatient/Agency/Support Group Needs  other (see comments)    Discharge Facility/Level of Care Needs  nursing facility, intermediate        Discharge Plan     Row Name 05/20/20 0920       Plan    Plan  [facility]    Patient/Family in Agreement with Plan  yes    Plan Comments  Pt came in from [facility], per Marisa Esquivel there, bed held for return at [facility] level care. Pt has [employer] so please give estimated time of return in case precert from ins. needed. Will follow. [phone]        Destination      Coordination has not been started for this encounter.      Durable Medical Equipment      Coordination has not been started for this encounter.      Dialysis/Infusion      Coordination has not been started for this encounter.      Home Medical Care      Coordination has not been started for this encounter.      Therapy      Coordination has not been started for this encounter.      Community Resources      Coordination has not been started for this encounter.          Demographic Summary    No documentation.       Functional Status    No documentation.       Psychosocial    No documentation.       Abuse/Neglect    No documentation.        Legal    No documentation.       Substance Abuse    No documentation.       Patient Forms    No documentation.           CHIQUIS Bateman

## 2020-05-20 NOTE — NURSING NOTE
Patient's B/P elevated 190/78; Dr. Pat was paged x 2. MD notified of pressures. Waiting for response. Patient is asymptomatic.

## 2020-05-20 NOTE — PLAN OF CARE
Problem: Patient Care Overview  Goal: Plan of Care Review  Outcome: Ongoing (interventions implemented as appropriate)  Flowsheets  Taken 5/20/2020 0518  Progress: no change  Outcome Summary: Pt denies pain this shift. Bed pan. IVF. Pt has dementia and is only oriented to self. Pt is hypertensive but is asymptomatic. Turn q2h. Safety maintained. Will continue to monitor.  Taken 5/19/2020 2130  Plan of Care Reviewed With: patient

## 2020-05-20 NOTE — PLAN OF CARE
Problem: Fall Risk (Adult)  Goal: Identify Related Risk Factors and Signs and Symptoms  Outcome: Ongoing (interventions implemented as appropriate)

## 2020-05-20 NOTE — PLAN OF CARE
Patient admitted with weakness & UTI. IVF, voiding, clear liquids, SCDs Patient confused - oriented to self only. Called and spoke with daughter regarding patients care and history. Continue to monitor     Problem: Patient Care Overview  Goal: Plan of Care Review  Outcome: Ongoing (interventions implemented as appropriate)

## 2020-05-20 NOTE — PROGRESS NOTES
"    [facility] Medicine Services  INPATIENT PROGRESS NOTE    Length of Stay: 1  Date of Admission: 5/19/2020  Primary Care Physician: Nunu Finch MD    Subjective   Chief Complaint: Follow-up near syncope  HPI   Patient resting in bed.  She tells me that she feels fine today.  She denies any shortness of breath.  She denies abdominal pain, nausea, or vomiting.  She has not had a bowel movement since admission per nursing.  Patient was extremely weak trying to get to have a bedside commode with nursing assistance last night, but no syncopal episode or presyncopal symptoms reported.  When the patient is asked if she is hurting anywhere, she tells me \"yes\", but then is unable to specify where she is hurting.    Review of Systems   Difficult to fully assess due to dementia.    Objective    Temp:  [97.3 °F (36.3 °C)-98.4 °F (36.9 °C)] 97.8 °F (36.6 °C)  Heart Rate:  [58-70] 58  Resp:  [16-18] 18  BP: (153-190)/(52-78) 167/52  Physical Exam   Constitutional: She appears well-developed and well-nourished. No distress.   HENT:   Blind   Neck: Normal range of motion. Neck supple. No JVD present. No tracheal deviation present.   Cardiovascular: Normal rate, regular rhythm and intact distal pulses. Exam reveals no gallop and no friction rub.   Pulmonary/Chest: Effort normal and breath sounds normal. She has no wheezes. She has no rales.   Abdominal: Soft. Bowel sounds are normal. She exhibits no distension. There is no tenderness. There is no guarding.   Musculoskeletal: She exhibits no edema or tenderness.   Neurological: She is alert.   Oriented to person only   Skin: Skin is warm and dry. No rash noted. No erythema.   Psychiatric: She has a normal mood and affect. Her behavior is normal.   Vitals reviewed.    Results Review:  I have reviewed the labs, radiology results, and diagnostic studies.    Laboratory Data:   Results from last 7 days   Lab Units 05/20/20  0817 05/19/20  1125   WBC " 10*3/mm3 4.63 5.84   HEMOGLOBIN g/dL 8.3* 9.1*   HEMATOCRIT % 24.8* 27.5*   PLATELETS 10*3/mm3 190 202     Results from last 7 days   Lab Units 05/20/20  0557 05/19/20  1125   SODIUM mmol/L 139 135*   POTASSIUM mmol/L 4.5 4.0   CHLORIDE mmol/L 107 100   CO2 mmol/L 21.0* 21.0*   BUN mg/dL 28* 38*   CREATININE mg/dL 1.20* 1.62*   CALCIUM mg/dL 8.7 9.0   BILIRUBIN mg/dL  --  0.3   ALK PHOS U/L  --  72   ALT (SGPT) U/L  --  7   AST (SGOT) U/L  --  15   GLUCOSE mg/dL 94 118*     I have reviewed the patient current medications.     Assessment/Plan     Active Hospital Problems    Diagnosis   • **Near syncope   • Proctitis   • Bacterial UTI   • Essential hypertension   • Nausea with vomiting   • Volume depletion   • Lactic acidosis, slight (2.1)   • Acute renal failure (ARF) (CMS/[facility])   • Dementia (CMS/[facility])   • Legal blindness   • Normocytic anemia     Plan:  1.  Patient admitted 5/19 after having a near syncopal episode at skilled nursing facility.  Per nursing facility report, the patient had apparently also been complaining of nausea/vomiting.  2.  Urinalysis showed large leuk esterase, 31-50 white blood cells, and 2+ bacteria.  CT scan of the abdomen and pelvis showed proctitis versus stercoral colitis, would have a low suspicion for this at this point given lack of abdominal symptoms.  No bowel movement since admission.  WBC normal, afebrile.  Her urine culture is still pending at this time.  We will continue Invanz for now, can likely simplify antibiotic therapy soon.  3.  Patient felt to have an acute kidney injury on admission.  Creatinine in February 2020 was 0.9, and was 1.6 on admission yesterday.  This is now 1.2.  Continue gentle IV fluid hydration, decrease to 50 mL/h.  Encourage oral intake.  4.  Blood pressure trend noted overnight.  Continue Toprol-XL at current dosage, would not increase this with resting heart rates in the 50s.  Will restart lisinopril today, but continue to hold hydrochlorothiazide.   Norvasc was started by Dr. Adilia melara.  Monitor blood pressure trend.  5.  Anemia substrates consistent with anemia of chronic disease.  Would benefit from oral iron supplementation.  6.  Advance to regular diet  7.  PT/OT consults.   following to coordinate discharge back to skilled nursing facility.  8.  Labs in AM    Discharge Planning: I expect the patient to be discharged to SNF in 1-2 days.    CONNIE Velez   05/20/20   13:12     I personally evaluated and examined the patient in conjunction with CONNIE Epps and agree with the assessment, treatment plan, and disposition of the patient as recorded by her. My history, exam, and further recommendations are:     Discussed with her nurse, Malorie.    No events overnight.    Her lactic acidosis resolved yesterday.  Her serum creatinine has improved to 1.20 from 1.62.    She continues on Invanz to cover a bacterial urinary tract infection and also possible proctitis.  She has not had a bowel movement according to the nursing staff.    Urine culture pending.    We will look towards discharge back to skilled nursing tomorrow or Friday.    Teddy Tierney DO  05/20/20  13:47

## 2020-05-21 LAB
ANION GAP SERPL CALCULATED.3IONS-SCNC: 10 MMOL/L (ref 5–15)
BACTERIA SPEC AEROBE CULT: ABNORMAL
BUN BLD-MCNC: 18 MG/DL (ref 8–23)
BUN/CREAT SERPL: 15.8 (ref 7–25)
CALCIUM SPEC-SCNC: 8.8 MG/DL (ref 8.2–9.6)
CHLORIDE SERPL-SCNC: 107 MMOL/L (ref 98–107)
CO2 SERPL-SCNC: 21 MMOL/L (ref 22–29)
CREAT BLD-MCNC: 1.14 MG/DL (ref 0.57–1)
DEPRECATED RDW RBC AUTO: 53.1 FL (ref 37–54)
ERYTHROCYTE [DISTWIDTH] IN BLOOD BY AUTOMATED COUNT: 15.7 % (ref 12.3–15.4)
GFR SERPL CREATININE-BSD FRML MDRD: 45 ML/MIN/1.73
GLUCOSE BLD-MCNC: 103 MG/DL (ref 65–99)
HCT VFR BLD AUTO: 25.3 % (ref 34–46.6)
HGB BLD-MCNC: 8.6 G/DL (ref 12–15.9)
MCH RBC QN AUTO: 31.3 PG (ref 26.6–33)
MCHC RBC AUTO-ENTMCNC: 34 G/DL (ref 31.5–35.7)
MCV RBC AUTO: 92 FL (ref 79–97)
PLATELET # BLD AUTO: 200 10*3/MM3 (ref 140–450)
PMV BLD AUTO: 11.4 FL (ref 6–12)
POTASSIUM BLD-SCNC: 3.9 MMOL/L (ref 3.5–5.2)
RBC # BLD AUTO: 2.75 10*6/MM3 (ref 3.77–5.28)
SODIUM BLD-SCNC: 138 MMOL/L (ref 136–145)
STREP GROUPING: ABNORMAL
WBC NRBC COR # BLD: 5.1 10*3/MM3 (ref 3.4–10.8)

## 2020-05-21 PROCEDURE — 25010000002 ERTAPENEM PER 500 MG: Performed by: NURSE PRACTITIONER

## 2020-05-21 PROCEDURE — 87635 SARS-COV-2 COVID-19 AMP PRB: CPT | Performed by: INTERNAL MEDICINE

## 2020-05-21 PROCEDURE — 80048 BASIC METABOLIC PNL TOTAL CA: CPT | Performed by: NURSE PRACTITIONER

## 2020-05-21 PROCEDURE — 85027 COMPLETE CBC AUTOMATED: CPT | Performed by: NURSE PRACTITIONER

## 2020-05-21 RX ADMIN — LISINOPRIL 20 MG: 20 TABLET ORAL at 08:48

## 2020-05-21 RX ADMIN — ATORVASTATIN CALCIUM 10 MG: 10 TABLET, FILM COATED ORAL at 21:05

## 2020-05-21 RX ADMIN — OXYBUTYNIN CHLORIDE 10 MG: 5 TABLET, EXTENDED RELEASE ORAL at 08:48

## 2020-05-21 RX ADMIN — AMLODIPINE BESYLATE 5 MG: 5 TABLET ORAL at 08:48

## 2020-05-21 RX ADMIN — QUETIAPINE FUMARATE 25 MG: 25 TABLET ORAL at 21:05

## 2020-05-21 RX ADMIN — SODIUM CHLORIDE, PRESERVATIVE FREE 10 ML: 5 INJECTION INTRAVENOUS at 21:05

## 2020-05-21 RX ADMIN — PANTOPRAZOLE SODIUM 40 MG: 40 TABLET, DELAYED RELEASE ORAL at 06:09

## 2020-05-21 RX ADMIN — POLYETHYLENE GLYCOL (3350) 17 G: 17 POWDER, FOR SOLUTION ORAL at 08:48

## 2020-05-21 RX ADMIN — FERROUS SULFATE TAB 325 MG (65 MG ELEMENTAL FE) 325 MG: 325 (65 FE) TAB at 08:48

## 2020-05-21 RX ADMIN — SODIUM CHLORIDE 500 MG: 9 INJECTION, SOLUTION INTRAVENOUS at 14:14

## 2020-05-21 RX ADMIN — QUETIAPINE FUMARATE 25 MG: 25 TABLET ORAL at 08:48

## 2020-05-21 RX ADMIN — METOPROLOL SUCCINATE 100 MG: 100 TABLET, EXTENDED RELEASE ORAL at 08:49

## 2020-05-21 NOTE — PROGRESS NOTES
"    [facility] Medicine Services  INPATIENT PROGRESS NOTE    Length of Stay: 2  Date of Admission: 5/19/2020  Primary Care Physician: Nunu Finch MD    Subjective   Chief Complaint: Follow-up near syncopal episode  HPI   Lying in bed.  No oxygen in use.  No family in room.  She denies any complaints.  She tells me her name.  She denies shortness of breath, nausea, vomiting or abdominal pain.  No presyncopal symptoms noted.  She told me her daughter's name was Tressa; otherwise, she does not answer questions appropriately.  She follows commands.  She answers \"yes or no\" to most questions.  Physical therapy worked with patient.  Discussed with nurse [place].  Patient is tolerating diet.  Had bowel movement on day of admission.    Review of Systems   Unable to obtain complete review of systems due to baseline dementia.  All pertinent negatives and positives are as above. All other systems have been reviewed and are negative unless otherwise stated.     Objective    Temp:  [97.9 °F (36.6 °C)-98.5 °F (36.9 °C)] 97.9 °F (36.6 °C)  Heart Rate:  [56-64] 60  Resp:  [16] 16  BP: (143-175)/(55-62) 152/58  Physical Exam   Constitutional:   No distress.  Lying in bed.  No oxygen in use.  No complaints offered.   HENT:   Head: Normocephalic and atraumatic.   Eyes: Pupils are equal, round, and reactive to light. EOM are normal.   Neck: Normal range of motion. Neck supple.   Cardiovascular: Normal rate, regular rhythm, normal heart sounds and intact distal pulses. Exam reveals no gallop and no friction rub.   No murmur heard.  Pulmonary/Chest: Effort normal and breath sounds normal. She has no wheezes. She has no rales.   No oxygen in use.   Abdominal: Soft. Bowel sounds are normal.   Genitourinary:   Genitourinary Comments: Brief in place.   Musculoskeletal: She exhibits no edema, tenderness or deformity.   SCDs bilateral lower extremities.   Neurological:   Oriented to person only.  Follows " commands.  Does not answer questions appropriately.  Tells me her daughter's name is Tressa.   Skin: Skin is warm and dry. No rash noted. No erythema. No pallor.   Psychiatric: She has a normal mood and affect.   Impaired judgment     Results Review:  I have reviewed the labs, radiology results, and diagnostic studies.    Laboratory Data:   Results from last 7 days   Lab Units 05/21/20  0603 05/20/20  0817 05/19/20  1125   WBC 10*3/mm3 5.10 4.63 5.84   HEMOGLOBIN g/dL 8.6* 8.3* 9.1*   HEMATOCRIT % 25.3* 24.8* 27.5*   PLATELETS 10*3/mm3 200 190 202        Results from last 7 days   Lab Units 05/21/20  0603 05/20/20  0557 05/19/20  1125   SODIUM mmol/L 138 139 135*   POTASSIUM mmol/L 3.9 4.5 4.0   CHLORIDE mmol/L 107 107 100   CO2 mmol/L 21.0* 21.0* 21.0*   BUN mg/dL 18 28* 38*   CREATININE mg/dL 1.14* 1.20* 1.62*   CALCIUM mg/dL 8.8 8.7 9.0   BILIRUBIN mg/dL  --   --  0.3   ALK PHOS U/L  --   --  72   ALT (SGPT) U/L  --   --  7   AST (SGOT) U/L  --   --  15   GLUCOSE mg/dL 103* 94 118*     Blood Culture   Date Value Ref Range Status   05/19/2020 No growth at 2 days  Preliminary   05/19/2020 No growth at 2 days  Preliminary     Imaging Results (All)     Procedure Component Value Units Date/Time    CT Abdomen Pelvis Without Contrast [[id number]] Collected:  05/19/20 1216     Updated:  05/19/20 1227    Narrative:       CT ABDOMEN PELVIS WO CONTRAST- 5/19/2020 12:07 PM CDT     HISTORY: Abd pain, fever, abscess suspected      COMPARISON: None      DOSE LENGTH PRODUCT: 230 mGy cm. Automated exposure control was also  utilized to decrease patient radiation dose.     TECHNIQUE: Noncontrast enhanced images of the abdomen and pelvis  obtained without oral contrast. Multiplanar reformatted images were  provided for review.      FINDINGS:   LOWER CHEST: Mild scarring in both bases. No consolidation.      LIVER: No focal liver lesion within limits of a noncontrast study.      BILIARY SYSTEM: The gallbladder has been removed. There  is no evidence  of biliary ductal dilation.      PANCREAS: Diffuse atrophy. No obvious focal lesion.      SPLEEN: Normal size.      KIDNEYS: Bilateral kidneys are grossly unremarkable. No hydronephrosis.  The ureters are decompressed and normal in appearance.     ADRENALS: Unremarkable.     RETROPERITONEUM: No mass, lymphadenopathy or hemorrhage.      GI TRACT: Stomach is unremarkable. The small bowel is nondilated. There  appears to be prior appendectomy. The colon is mostly decompressed.  Prominent rectal stool with mild distention. Notable circumferential  wall thickening at the rectum with adjacent inflammatory stranding in  the mesorectal fat. No extraluminal collection/abscess identified.  Underlying sigmoid diverticulosis.     OTHER: No intraperitoneal free fluid or free air. No mesenteric  adenopathy. Rectus diastases. Age indeterminant wedge compression  deformities at L1 and L2.      PELVIS: No mass lesion, fluid collection or significant lymphadenopathy  is seen in the pelvis. The urinary bladder is normal in appearance.       Impression:       1. Proctitis vs stercoral colitis. Proctitis is favored, as the rectal  is only slightly distended. Associated inflammatory stranding in the  mesorectal fat. No visualized perforation or evidence of bowel  obstruction.  This report was finalized on 05/19/2020 12:24 by Dr Constantin Sanchez, .          Intake/Output    Intake/Output Summary (Last 24 hours) at 5/21/2020 1358  Last data filed at 5/21/2020 1257  Gross per 24 hour   Intake 650 ml   Output 1300 ml   Net -650 ml       Scheduled Meds    amLODIPine 5 mg Oral Q24H   atorvastatin 10 mg Oral Nightly   ertapenem 500 mg Intravenous Q24H   ferrous sulfate 325 mg Oral Daily With Breakfast   lisinopril 20 mg Oral Daily   metoprolol succinate  mg Oral Daily   oxybutynin XL 10 mg Oral Daily   pantoprazole 40 mg Oral QAM   polyethylene glycol 17 g Oral Daily   QUEtiapine 25 mg Oral TID   sodium chloride 10 mL  Intravenous Q12H       I have reviewed the patient current medications.     Assessment/Plan     Active Hospital Problems    Diagnosis   • **Near syncope   • Proctitis   • Bacterial UTI   • Essential hypertension   • Nausea with vomiting   • Volume depletion   • Lactic acidosis, slight (2.1)   • Acute renal failure (ARF) (CMS/[facility])   • Dementia (CMS/[facility])   • Legal blindness   • Normocytic anemia     Plan:  1.  Admitted 5/19 after near syncopal episode at [facility].  Nursing facility staff reported patient complaining of nausea and vomiting.  2.  Urinalysis 31-50 WBCs, 2+ bacteria.  CT scan of the abdomen pelvis showed proctitis versus stercoral colitis.  Low suspicion as patient lack of abdominal symptoms.  Nurses report bowel movement on day of admission.  White blood cell count remains normal.  Continue Invanz IV day 3.  Await final urine culture still pending.  Blood culture no growth at 2 days.  3.  Acute kidney injury with creatinine 1.62 on admission.  Creatinine 1.14 today.  Creatinine 0.9 on February 2020. Patient lost IV access and has not been receiving IV fluids.  Nurses report adequate oral intake.  Basic metabolic panel tomorrow  4.  Pressure 152/58, 143/55.  Toprol and lisinopril resumed.  HCTZ on hold.  Norvasc started 5/20.  5.  Chronic normocytic anemia.  Iron and iron saturation normal.  6.  SCDs for deep antibiosis prophylaxis  7.  Physical therapy occupational therapy.  Nursing staff reports patient does not ambulate.  Physical therapy worked with patient at bedside.  8.  Social service for discharge planning.  No pre-CERT needed before returning to SNF.  Will require patient received COVID test.  Ordered.  Await results.  9.  Home medications reviewed resumed if appropriate..    Her daughter, Tressa will assist with decision-making.  The above documentation resulted from a face-to-face encounter by me Sand[facility]ALES[facility].      Discharge Planning: I expect patient to be discharged  to [facility] in 1 day.    CONNIE Neal   05/21/20   13:58    I personally evaluated and examined the patient in conjunction with CONNIE Philip and agree with the assessment, treatment plan, and disposition of the patient as recorded by her. My history, exam, and further recommendations are:     Discussed with her nurse, Malorie.    Urine culture still has not resulted.  We have continued to cover her with Invanz for the time being as it would also do well to cover any possible colitis, which I am still not convinced she has.    Her renal function continues to improve with hydration.  She is said to be eating and drinking well so fluids will be discontinued.    Her skilled nursing facility is going to require a screening coronavirus test prior to returning.  I anticipate that we can discharge her tomorrow so we will swab her this afternoon.    Teddy Tierney,   05/21/20  14:29

## 2020-05-21 NOTE — NURSING NOTE
A total of 6 IV attempts were made. All attempts failed. One attempt by myself, 2 by sara conklin, 3 by a unit nurse. Pt tolerated well.

## 2020-05-21 NOTE — PLAN OF CARE
Problem: Fall Risk (Adult)  Goal: Identify Related Risk Factors and Signs and Symptoms  Outcome: Ongoing (interventions implemented as appropriate)     Problem: Fall Risk (Adult)  Goal: Identify Related Risk Factors and Signs and Symptoms  Outcome: Ongoing (interventions implemented as appropriate)     Problem: Patient Care Overview  Goal: Plan of Care Review  Outcome: Ongoing (interventions implemented as appropriate)  Flowsheets (Taken 5/21/2020 4479)  Plan of Care Reviewed With: patient  Outcome Summary: no complaints today new iv started for iv abx

## 2020-05-21 NOTE — PROGRESS NOTES
Continued Stay Note  [facility]     Patient Name: Vannesa Davis  MRN: [id number]  Today's Date: 5/21/2020    Admit Date: 5/19/2020    Discharge Plan     Row Name 05/21/20 1303       Plan    Plan  [facility]    Patient/Family in Agreement with Plan  yes    Plan Comments  Pt will not need ins. precert before return, will return [facility] care. They do however require that pt receive Covid test before return. Will inform Dr. Tierney. Will follow. [phone]        Discharge Codes    No documentation.             CHIQUIS Bateman

## 2020-05-21 NOTE — PLAN OF CARE
Problem: Patient Care Overview  Goal: Plan of Care Review  Outcome: Ongoing (interventions implemented as appropriate)  Flowsheets (Taken 5/21/2020 0152)  Progress: no change  Note:   Pt remains pleasantly disoriented to time, place, and situation. Nonverbal indicators of pain absent. Oral intake encouraged. Vss. Safety maintained. Will continue to monitor.

## 2020-05-22 VITALS
BODY MASS INDEX: 27.23 KG/M2 | RESPIRATION RATE: 16 BRPM | WEIGHT: 148 LBS | TEMPERATURE: 98.3 F | OXYGEN SATURATION: 96 % | SYSTOLIC BLOOD PRESSURE: 157 MMHG | DIASTOLIC BLOOD PRESSURE: 60 MMHG | HEART RATE: 63 BPM | HEIGHT: 62 IN

## 2020-05-22 LAB
ANION GAP SERPL CALCULATED.3IONS-SCNC: 10 MMOL/L (ref 5–15)
BUN BLD-MCNC: 13 MG/DL (ref 8–23)
BUN/CREAT SERPL: 11.6 (ref 7–25)
CALCIUM SPEC-SCNC: 9 MG/DL (ref 8.2–9.6)
CHLORIDE SERPL-SCNC: 105 MMOL/L (ref 98–107)
CO2 SERPL-SCNC: 23 MMOL/L (ref 22–29)
CREAT BLD-MCNC: 1.12 MG/DL (ref 0.57–1)
GFR SERPL CREATININE-BSD FRML MDRD: 46 ML/MIN/1.73
GLUCOSE BLD-MCNC: 107 MG/DL (ref 65–99)
POTASSIUM BLD-SCNC: 3.6 MMOL/L (ref 3.5–5.2)
SARS-COV-2 RNA RESP QL NAA+PROBE: NOT DETECTED
SODIUM BLD-SCNC: 138 MMOL/L (ref 136–145)

## 2020-05-22 PROCEDURE — 80048 BASIC METABOLIC PNL TOTAL CA: CPT | Performed by: NURSE PRACTITIONER

## 2020-05-22 RX ORDER — FERROUS SULFATE 325(65) MG
325 TABLET ORAL
Start: 2020-05-23

## 2020-05-22 RX ORDER — AMLODIPINE BESYLATE 10 MG/1
10 TABLET ORAL
Start: 2020-05-23

## 2020-05-22 RX ORDER — NITROFURANTOIN 25; 75 MG/1; MG/1
100 CAPSULE ORAL EVERY 12 HOURS SCHEDULED
Status: DISCONTINUED | OUTPATIENT
Start: 2020-05-22 | End: 2020-05-22 | Stop reason: HOSPADM

## 2020-05-22 RX ORDER — AMLODIPINE BESYLATE 10 MG/1
10 TABLET ORAL
Status: DISCONTINUED | OUTPATIENT
Start: 2020-05-22 | End: 2020-05-22 | Stop reason: HOSPADM

## 2020-05-22 RX ORDER — NITROFURANTOIN 25; 75 MG/1; MG/1
100 CAPSULE ORAL EVERY 12 HOURS SCHEDULED
Qty: 9 CAPSULE | Refills: 0
Start: 2020-05-22 | End: 2020-05-27

## 2020-05-22 RX ADMIN — LISINOPRIL 20 MG: 20 TABLET ORAL at 09:00

## 2020-05-22 RX ADMIN — NITROFURANTOIN MONOHYDRATE/MACROCRYSTALLINE 100 MG: 25; 75 CAPSULE ORAL at 09:00

## 2020-05-22 RX ADMIN — OXYBUTYNIN CHLORIDE 10 MG: 5 TABLET, EXTENDED RELEASE ORAL at 09:00

## 2020-05-22 RX ADMIN — PANTOPRAZOLE SODIUM 40 MG: 40 TABLET, DELAYED RELEASE ORAL at 05:24

## 2020-05-22 RX ADMIN — FERROUS SULFATE TAB 325 MG (65 MG ELEMENTAL FE) 325 MG: 325 (65 FE) TAB at 09:00

## 2020-05-22 RX ADMIN — AMLODIPINE BESYLATE 10 MG: 10 TABLET ORAL at 05:24

## 2020-05-22 RX ADMIN — QUETIAPINE FUMARATE 25 MG: 25 TABLET ORAL at 09:00

## 2020-05-22 RX ADMIN — AMLODIPINE BESYLATE 10 MG: 10 TABLET ORAL at 09:00

## 2020-05-22 NOTE — PAYOR COMM NOTE
"Vannesa Davis (89 y.o. Female) [id number]    D/C  Notification   D/c summary   [facility]   brent phone   Fax        Date of Birth Social Security Number Address Home Phone MRN    07/03/1929  [address]  [place] [address] [phone] [id number]    Muslim Marital Status          Hindu        Admission Date Admission Type Admitting Provider Attending Provider Department, Room/Bed    5/19/20 Emergency Teddy Tierney DO  [facility] 3C, 371/1    Discharge Date Discharge Disposition Discharge Destination        5/22/2020 Skilled Nursing Facility (DC - External)              Attending Provider:  (none)   Allergies:  Ampicillin    Isolation:  None   Infection:  COVID (rule out) (05/21/20)   Code Status:  CPR    Ht:  157.5 cm (62\")   Wt:  67.1 kg (148 lb)    Admission Cmt:  None   Principal Problem:  Near syncope [R55]                 Active Insurance as of 5/19/2020     Primary Coverage     Payor Plan Insurance Group Employer/Plan Group    HUMANA MEDICARE REPLACEMENT HUMANA MEDICARE REPLACEMENT [id number]     Payor Plan Address Payor Plan Phone Number Payor Plan Fax Number Effective Dates    [address] [address] [phone]  4/1/2011 - None Entered    Former[facility] [id number]       Subscriber Name Subscriber Birth Date Member ID       VANNESA DAVIS 7/3/1929 [id number]                 Emergency Contacts      (Rel.) Home Phone Work Phone Mobile Phone    Tressa You (Daughter) [phone] -- --               Discharge Summary      Teddy Tierney DO at 05/22/20 0940              [facility] Hospital Medicine Services  DISCHARGE SUMMARY     Date of Admission: 5/19/2020  Date of Discharge:  5/22/2020  Primary Care Physician: Nunu Finch MD    Presenting Problem/History of Present Illness:  Weakness, near syncope.    Discharge Diagnoses:  Active Hospital Problems    Diagnosis   • **Near " syncope   • Proctitis   • Bacterial UTI     Streptococcus agalactiae 5/19/2020     • Essential hypertension   • Nausea with vomiting   • Volume depletion   • Lactic acidosis, slight (2.1)   • Acute renal failure (ARF) (CMS/[facility])   • Dementia (CMS/[facility])   • Legal blindness   • Normocytic anemia     Chief Complaint on Day of Discharge: No complaints offered.    History of Present Illness on Day of Discharge:   Lying in bed.  Nursing staff feeding patient.  Eating without abdominal pain or nausea or vomiting.  Patient does not answer questions appropriately.  She tells me her name and her daughter's name.  She will follow commands.  She denies chest pain, palpitations, or shortness of breath.  No oxygen in use.    Consults: None    Procedures Performed: None    Pertinent Test Results:   Laboratory Data:    Results from last 7 days   Lab Units 05/21/20  0603 05/20/20  0817 05/19/20  1125   WBC 10*3/mm3 5.10 4.63 5.84   HEMOGLOBIN g/dL 8.6* 8.3* 9.1*   HEMATOCRIT % 25.3* 24.8* 27.5*   PLATELETS 10*3/mm3 200 190 202        Results from last 7 days   Lab Units 05/22/20  0611 05/21/20  0603 05/20/20  0557 05/19/20  1125   SODIUM mmol/L 138 138 139 135*   POTASSIUM mmol/L 3.6 3.9 4.5 4.0   CHLORIDE mmol/L 105 107 107 100   CO2 mmol/L 23.0 21.0* 21.0* 21.0*   BUN mg/dL 13 18 28* 38*   CREATININE mg/dL 1.12* 1.14* 1.20* 1.62*   CALCIUM mg/dL 9.0 8.8 8.7 9.0   BILIRUBIN mg/dL  --   --   --  0.3   ALK PHOS U/L  --   --   --  72   ALT (SGPT) U/L  --   --   --  7   AST (SGOT) U/L  --   --   --  15   GLUCOSE mg/dL 107* 103* 94 118*     Lab Results (all)     Procedure Component Value Units Date/Time    Basic Metabolic Panel [[id number]]  (Abnormal) Collected:  05/22/20 0611    Specimen:  Blood Updated:  05/22/20 0635     Glucose 107 mg/dL      BUN 13 mg/dL      Creatinine 1.12 mg/dL      Sodium 138 mmol/L      Potassium 3.6 mmol/L      Chloride 105 mmol/L      CO2 23.0 mmol/L      Calcium 9.0 mg/dL      eGFR Non  Amer 46  mL/min/1.73      BUN/Creatinine Ratio 11.6     Anion Gap 10.0 mmol/L     Narrative:       GFR Normal >60  Chronic Kidney Disease <60  Kidney Failure <15      SARS-COV-2 PCR (Reading IN-HOUSE PERFORMED), NP SWAB IN TRANSPORT MEDIA - Swab, Nasopharynx [[id number]]  (Normal) Collected:  05/21/20 1415    Specimen:  Swab from Nasopharynx Updated:  05/22/20 0233     COVID19 Not Detected    Urine Culture - Urine, Urine, Catheter In/Out [[id number]]  (Abnormal) Collected:  05/19/20 1158    Specimen:  Urine, Catheter In/Out Updated:  05/21/20 1456     Urine Culture <25,000 CFU/mL Streptococcus agalactiae (Group B)     Comment:   This organism is considered to be universally susceptible to penicillin.  No further antibiotic testing will be performed.        STREP GROUPING B    Blood Culture - Blood, Arm, Left [[id number]] Collected:  05/19/20 1125    Specimen:  Blood from Arm, Left Updated:  05/21/20 1345     Blood Culture No growth at 2 days    Blood Culture - Blood, Arm, Right [[id number]] Collected:  05/19/20 1155    Specimen:  Blood from Arm, Right Updated:  05/21/20 1315     Blood Culture No growth at 2 days    Basic Metabolic Panel [[id number]]  (Abnormal) Collected:  05/21/20 0603    Specimen:  Blood Updated:  05/21/20 0717     Glucose 103 mg/dL      BUN 18 mg/dL      Creatinine 1.14 mg/dL      Sodium 138 mmol/L      Potassium 3.9 mmol/L      Chloride 107 mmol/L      CO2 21.0 mmol/L      Calcium 8.8 mg/dL      eGFR Non African Amer 45 mL/min/1.73      BUN/Creatinine Ratio 15.8     Anion Gap 10.0 mmol/L     Narrative:       GFR Normal >60  Chronic Kidney Disease <60  Kidney Failure <15      CBC (No Diff) [[id number]]  (Abnormal) Collected:  05/21/20 0603    Specimen:  Blood Updated:  05/21/20 0705     WBC 5.10 10*3/mm3      RBC 2.75 10*6/mm3      Hemoglobin 8.6 g/dL      Hematocrit 25.3 %      MCV 92.0 fL      MCH 31.3 pg      MCHC 34.0 g/dL      RDW 15.7 %      RDW-SD 53.1 fl      MPV 11.4 fL      Platelets 200 10*3/mm3      CBC (No Diff) [[id number]]  (Abnormal) Collected:  05/20/20 0817    Specimen:  Blood Updated:  05/20/20 0841     WBC 4.63 10*3/mm3      RBC 2.70 10*6/mm3      Hemoglobin 8.3 g/dL      Hematocrit 24.8 %      MCV 91.9 fL      MCH 30.7 pg      MCHC 33.5 g/dL      RDW 15.7 %      RDW-SD 53.1 fl      MPV 11.2 fL      Platelets 190 10*3/mm3     Basic Metabolic Panel [[id number]]  (Abnormal) Collected:  05/20/20 0557    Specimen:  Blood Updated:  05/20/20 0710     Glucose 94 mg/dL      BUN 28 mg/dL      Creatinine 1.20 mg/dL      Sodium 139 mmol/L      Potassium 4.5 mmol/L      Chloride 107 mmol/L      CO2 21.0 mmol/L      Calcium 8.7 mg/dL      eGFR Non African Amer 42 mL/min/1.73      BUN/Creatinine Ratio 23.3     Anion Gap 11.0 mmol/L     Narrative:       GFR Normal >60  Chronic Kidney Disease <60  Kidney Failure <15      Vitamin B12 [[id number]]  (Normal) Collected:  05/19/20 1125    Specimen:  Blood Updated:  05/20/20 0137     Vitamin B-12 283 pg/mL     Narrative:       Results may be falsely increased if patient taking Biotin.      Ferritin [[id number]]  (Abnormal) Collected:  05/19/20 1125    Specimen:  Blood Updated:  05/19/20 1619     Ferritin 179.30 ng/mL     Narrative:       Results may be falsely decreased if patient taking Biotin.      Lactic Acid, Reflex [[id number]]  (Normal) Collected:  05/19/20 1547    Specimen:  Blood Updated:  05/19/20 1615     Lactate 1.4 mmol/L     Iron Profile [[id number]]  (Abnormal) Collected:  05/19/20 1125    Specimen:  Blood Updated:  05/19/20 1614     Iron 61 mcg/dL      Iron Saturation 22 %      Transferrin 183 mg/dL      TIBC 273 mcg/dL     Lactic Acid, Reflex Timer (This will reflex a repeat order 3-3:15 hours after ordered.) [[id number]] Collected:  05/19/20 1125    Specimen:  Blood Updated:  05/19/20 1530     Hold Tube Hold for add-ons.     Comment: Auto resulted.       Urinalysis, Microscopic Only - Urine, Catheter In/Out [[id number]]  (Abnormal) Collected:  05/19/20 1151     "Specimen:  Urine, Catheter In/Out Updated:  05/19/20 1236     RBC, UA 6-12 /HPF      WBC, UA 31-50 /HPF      Bacteria, UA 2+ /HPF      Squamous Epithelial Cells, UA 7-12 /HPF      Hyaline Casts, UA None Seen /LPF      Methodology Automated Microscopy    Urinalysis With Culture If Indicated - Urine, Catheter In/Out [[id number]]  (Abnormal) Collected:  05/19/20 1158    Specimen:  Urine, Catheter In/Out Updated:  05/19/20 1219     Color, UA Yellow     Appearance, UA Clear     pH, UA 6.0     Specific Gravity, UA 1.015     Glucose, UA Negative     Ketones, UA Negative     Bilirubin, UA Negative     Blood, UA Moderate (2+)     Protein, UA Negative     Leuk Esterase, UA Large (3+)     Nitrite, UA Negative     Urobilinogen, UA 0.2 E.U./dL    Lactic Acid, Plasma [[id number]]  (Abnormal) Collected:  05/19/20 1125    Specimen:  Blood Updated:  05/19/20 1215     Lactate 2.1 mmol/L     Procalcitonin [[id number]]  (Abnormal) Collected:  05/19/20 1125    Specimen:  Blood Updated:  05/19/20 1204     Procalcitonin 0.07 ng/mL     Narrative:       As a Marker for Sepsis (Non-Neonates):   1. <0.5 ng/mL represents a low risk of severe sepsis and/or septic shock.  1. >2 ng/mL represents a high risk of severe sepsis and/or septic shock.    As a Marker for Lower Respiratory Tract Infections that require antibiotic therapy:  PCT on Admission     Antibiotic Therapy             6-12 Hrs later  > 0.5                Strongly Recommended            >0.25 - <0.5         Recommended  0.1 - 0.25           Discouraged                   Remeasure/reassess PCT  <0.1                 Strongly Discouraged          Remeasure/reassess PCT      As 28 day mortality risk marker: \"Change in Procalcitonin Result\" (> 80 % or <=80 %) if Day 0 (or Day 1) and Day 4 values are available. Refer to http://[email or web address]-pct-calculator.com/   Change in PCT <=80 %   A decrease of PCT levels below or equal to 80 % defines a positive change in PCT test result representing a higher " risk for 28-day all-cause mortality of patients diagnosed with severe sepsis or septic shock.  Change in PCT > 80 %   A decrease of PCT levels of more than 80 % defines a negative change in PCT result representing a lower risk for 28-day all-cause mortality of patients diagnosed with severe sepsis or septic shock.                Results may be falsely decreased if patient taking Biotin.     Comprehensive Metabolic Panel [[id number]]  (Abnormal) Collected:  05/19/20 1125    Specimen:  Blood Updated:  05/19/20 1201     Glucose 118 mg/dL      BUN 38 mg/dL      Creatinine 1.62 mg/dL      Sodium 135 mmol/L      Potassium 4.0 mmol/L      Chloride 100 mmol/L      CO2 21.0 mmol/L      Calcium 9.0 mg/dL      Total Protein 7.1 g/dL      Albumin 3.80 g/dL      ALT (SGPT) 7 U/L      AST (SGOT) 15 U/L      Alkaline Phosphatase 72 U/L      Total Bilirubin 0.3 mg/dL      eGFR Non African Amer 30 mL/min/1.73      Globulin 3.3 gm/dL      A/G Ratio 1.2 g/dL      BUN/Creatinine Ratio 23.5     Anion Gap 14.0 mmol/L     Narrative:       GFR Normal >60  Chronic Kidney Disease <60  Kidney Failure <15      Gray Top - Ice [[id number]] Collected:  05/19/20 1125    Specimen:  Blood Updated:  05/19/20 1158     Extra Tube --    Lipase [[id number]]  (Normal) Collected:  05/19/20 1125    Specimen:  Blood Updated:  05/19/20 1156     Lipase 53 U/L     Troponin [[id number]]  (Normal) Collected:  05/19/20 1125    Specimen:  Blood Updated:  05/19/20 1156     Troponin T <0.010 ng/mL     Narrative:       Troponin T Reference Range:  <= 0.03 ng/mL-   Negative for AMI  >0.03 ng/mL-     Abnormal for myocardial necrosis.  Clinicians would have to utilize clinical acumen, EKG, Troponin and serial changes to determine if it is an Acute Myocardial Infarction or myocardial injury due to an underlying chronic condition.       Results may be falsely decreased if patient taking Biotin.      CBC Auto Differential [[id number]]  (Abnormal) Collected:  05/19/20 1125     Specimen:  Blood Updated:  05/19/20 1140     WBC 5.84 10*3/mm3      RBC 2.95 10*6/mm3      Hemoglobin 9.1 g/dL      Hematocrit 27.5 %      MCV 93.2 fL      MCH 30.8 pg      MCHC 33.1 g/dL      RDW 15.7 %      RDW-SD 54.0 fl      MPV 11.0 fL      Platelets 202 10*3/mm3      Neutrophil % 66.5 %      Lymphocyte % 21.6 %      Monocyte % 8.4 %      Eosinophil % 2.9 %      Basophil % 0.3 %      Immature Grans % 0.3 %      Neutrophils, Absolute 3.88 10*3/mm3      Lymphocytes, Absolute 1.26 10*3/mm3      Monocytes, Absolute 0.49 10*3/mm3      Eosinophils, Absolute 0.17 10*3/mm3      Basophils, Absolute 0.02 10*3/mm3      Immature Grans, Absolute 0.02 10*3/mm3      nRBC 0.0 /100 WBC         Culture Data:   Blood Culture   Date Value Ref Range Status   05/19/2020 No growth at 2 days  Preliminary   05/19/2020 No growth at 2 days  Preliminary     Urine Culture   Date Value Ref Range Status   05/19/2020 <25,000 CFU/mL Streptococcus agalactiae (Group B) (A)  Final     Comment:       This organism is considered to be universally susceptible to penicillin.  No further antibiotic testing will be performed.     Imaging Results (All)     Procedure Component Value Units Date/Time    CT Abdomen Pelvis Without Contrast [[id number]] Collected:  05/19/20 1216     Updated:  05/19/20 1227    Narrative:       CT ABDOMEN PELVIS WO CONTRAST- 5/19/2020 12:07 PM CDT     HISTORY: Abd pain, fever, abscess suspected      COMPARISON: None      DOSE LENGTH PRODUCT: 230 mGy cm. Automated exposure control was also  utilized to decrease patient radiation dose.     TECHNIQUE: Noncontrast enhanced images of the abdomen and pelvis  obtained without oral contrast. Multiplanar reformatted images were  provided for review.      FINDINGS:   LOWER CHEST: Mild scarring in both bases. No consolidation.      LIVER: No focal liver lesion within limits of a noncontrast study.      BILIARY SYSTEM: The gallbladder has been removed. There is no evidence  of biliary  ductal dilation.      PANCREAS: Diffuse atrophy. No obvious focal lesion.      SPLEEN: Normal size.      KIDNEYS: Bilateral kidneys are grossly unremarkable. No hydronephrosis.  The ureters are decompressed and normal in appearance.     ADRENALS: Unremarkable.     RETROPERITONEUM: No mass, lymphadenopathy or hemorrhage.      GI TRACT: Stomach is unremarkable. The small bowel is nondilated. There  appears to be prior appendectomy. The colon is mostly decompressed.  Prominent rectal stool with mild distention. Notable circumferential  wall thickening at the rectum with adjacent inflammatory stranding in  the mesorectal fat. No extraluminal collection/abscess identified.  Underlying sigmoid diverticulosis.     OTHER: No intraperitoneal free fluid or free air. No mesenteric  adenopathy. Rectus diastases. Age indeterminant wedge compression  deformities at L1 and L2.      PELVIS: No mass lesion, fluid collection or significant lymphadenopathy  is seen in the pelvis. The urinary bladder is normal in appearance.       Impression:       1. Proctitis vs stercoral colitis. Proctitis is favored, as the rectal  is only slightly distended. Associated inflammatory stranding in the  mesorectal fat. No visualized perforation or evidence of bowel  obstruction.  This report was finalized on 05/19/2020 12:24 by Dr Constantin Sanchez, .        Hospital Course  Patient is a 89 y.o. female presented to [facility] emergency room 5/19/2020 from [facility] ER after an episode of weakness and near syncopal at the [facility] facility.  Patient had been reporting some nausea, vomiting and generalized abdominal pain.  Urinalysis 31-50 WBC, 2+ bacteria, WBC 5.84, hemoglobin 9.1, hematocrit 27.5, creatinine 1.62, sodium 135. CT scan of the abdomen reported proctitis versus stercoral colitis.  Proctitis favored as the rectal is only slightly distended.  Inflammatory stranding in mesorectal fat.  No evidence of bowel  obstruction.  She received lactated Ringer's fluid bolus and Invanz in the emergency room.    She was admitted to the medical floor with near syncopal episode, weakness nausea vomiting abdominal pain, concern for urinary tract infection and proctitis.  Invanz continued.  Patient did not appear septic.  Lactic acid 2.1.  Patient was not tachycardic and blood pressure normal.  No leukocytosis noted.  Creatinine 0.9 February 2020.  Creatinine 1.6 on admission.  Hydrochlorothiazide and lisinopril held.  SCDs placed for deep vein thrombosis prophylaxis.    Acute kidney injury with creatinine 1.62 on admission.  She was hydrated with IV fluids and creatinine returned to baseline 1.12.  Creatinine was noted be 0.9 February 2020.  Lisinopril resumed when creatinine returned to normal as blood pressure trended upward.  Hydrochlorothiazide not resumed during hospital stay or discharge.    Urine culture positive for Streptococcus agalactiae.  She received Invanz during hospital stay and was transitioned to Macrobid 100 mg twice daily for an additional 5 days beginning 5/22/2020.    CT scan of the abdomen noted proctitis versus stercoral colitis.  Proctitis favored.  Patient denied any abdominal pain.  She had a bowel movement on admission.  Invanz continued as it would cover both UTI and possible colitis.    History of hypertension.  Blood pressure remained elevated.  Lisinopril resumed.  Hydrochlorothiazide not continued on admission or at discharge.  Norvasc started and uptitrated to 10 mg daily with better blood pressure control.  Blood pressure 157/60 at discharge.    Chronic normocytic anemia remained stable with hemoglobin 8.6/hematocrit 25.3.  Iron 61, saturation 22%.  TIBC 273.    Physical therapy and Occupational Therapy consulted.    Patient with confusion at baseline.    On 5/22/2020 she is stable for discharge back to [facility].  She will complete Macrobid 100 mg twice daily for 5 days.  Norvasc  "added for blood pressure control.  Hydrochlorothiazide discontinued.  She is followed by Dr. Finch.    Physical Exam on Discharge:  /60 (BP Location: Right arm, Patient Position: Lying)   Pulse 63   Temp 98.3 °F (36.8 °C) (Oral)   Resp 16   Ht 157.5 cm (62\")   Wt 67.1 kg (148 lb)   SpO2 96%   BMI 27.07 kg/m²    Physical Exam   Constitutional:   Lying in bed.  Staff eating breakfast.  No oxygen in use.  No complaints voiced.   HENT:   Head: Normocephalic and atraumatic.   Eyes: Pupils are equal, round, and reactive to light. EOM are normal.   Blind   Neck: Normal range of motion. Neck supple.   Cardiovascular: Normal rate, regular rhythm, normal heart sounds and intact distal pulses. Exam reveals no gallop and no friction rub.   No murmur heard.  Pulmonary/Chest: She has no wheezes. She has no rales.   No oxygen in use.   Abdominal: Soft. Bowel sounds are normal. She exhibits no distension. There is no tenderness.   Genitourinary:   Genitourinary Comments: Brief in place.   Musculoskeletal: Normal range of motion. She exhibits no edema, tenderness or deformity.   SCDs bilateral lower extremities   Neurological:   Oriented to person only.  Follows commands.  Does not answer questions appropriately.   Skin: No rash noted. No erythema. No pallor.   Psychiatric:   Impaired thought     Condition on Discharge: Stable    Discharge Disposition: [facility]    Discharge Diet:   Diet Instructions     Advance Diet As Tolerated          Activity at Discharge:   Activity Instructions     Activity as Tolerated          Discharge Care Plan / Instructions:   1.  Norvasc 10 mg orally daily  2.  Discontinue hydrochlorothiazide  3.  Macrobid 100 mg twice daily for 5 days.  Begin 5/22/2020.  4.  For returning or worsening weakness with near syncope seek medical attention.    Discharge Medications:     Discharge Medications      New Medications      Instructions Start Date   amLODIPine 10 MG tablet  Commonly " known as:  NORVASC   10 mg, Oral, Every 24 Hours Scheduled   Start Date:  May 23, 2020     ferrous sulfate 325 (65 FE) MG tablet   325 mg, Oral, Daily With Breakfast   Start Date:  May 23, 2020     nitrofurantoin (macrocrystal-monohydrate) 100 MG capsule  Commonly known as:  MACROBID   100 mg, Oral, Every 12 Hours Scheduled.  For 5 days begin 5/22/2020.         Continue These Medications      Instructions Start Date   atorvastatin 10 MG tablet  Commonly known as:  LIPITOR   10 mg, Oral, Daily      esomeprazole 20 MG capsule  Commonly known as:  nexIUM   20 mg, Oral, Every Morning Before Breakfast      lisinopril 20 MG tablet  Commonly known as:  PRINIVIL,ZESTRIL   20 mg, Oral, Daily      metoprolol succinate  MG 24 hr tablet  Commonly known as:  TOPROL-XL   100 mg, Oral, Daily      Mirabegron ER 50 MG tablet sustained-release 24 hour 24 hr tablet  Commonly known as:  MYRBETRIQ   50 mg, Oral, Daily      QUEtiapine 25 MG tablet  Commonly known as:  SEROquel   25 mg, Oral, 3 Times Daily      UTI-Stat liquid   30 mL, Oral, 2 Times Daily         Stop These Medications    hydroCHLOROthiazide 12.5 MG capsule  Commonly known as:  MICROZIDE          Follow-up Appointments:   Follow-up Information     Nunu Finch MD Follow up.    Specialty:  Family Medicine  Why:  Follows at [facility]  Contact information:  [address] [place] [address] [phone]                 Test Results Pending at Discharge: None    The above documentation resulted from a face-to-face encounter by me Sandi ROSALES, [facility].    CONNIE Neal  05/22/20  09:53    Time: This discharge process required 35 minutes for completion.    Plan discussed with Dr. Tierney    Time spent in face-to-face evaluation, chart review, planning and education 35 minutes.    I personally evaluated and examined the patient in conjunction with CONNIE Philip and agree with the assessment, treatment plan, and  disposition of the patient as recorded by her. My history, exam, and further recommendations are:     Doing well.  No new events.  Her renal function is back to baseline.  She has Streptococcus agalactiae on culture with a low-grade colony count.  She is allergic to penicillin antibiotics.  I think that we can complete a few more days of nitrofurantoin at this point.  I am still not convinced that she ever had any significant colitis.  She is eating and drinking well per the nursing staff.  We have adjusted her antihypertensives a bit.  She can go back to her facility today.  She had a negative screening coronavirus test yesterday.    Donal Tierney DO  05/22/20  10:15            Electronically signed by Donal Tierney DO at 05/22/20 1016       Discharge Order (From admission, onward)     Start     Ordered    05/22/20 0951  Discharge patient  Once     Expected Discharge Date:  05/22/20    Discharge Disposition:  Skilled Nursing Facility (DC - External)    Physician of Record for Attribution - Please select from Treatment Team:  DONAL TIERNEY [[id number]]    Review needed by CMO to determine Physician of Record:  No       Question Answer Comment   Physician of Record for Attribution - Please select from Treatment Team DONAL TIERNEY    Review needed by CMO to determine Physician of Record No        05/22/20 0953

## 2020-05-22 NOTE — DISCHARGE SUMMARY
[facility]  DISCHARGE SUMMARY     Date of Admission: 5/19/2020  Date of Discharge:  5/22/2020  Primary Care Physician: Nunu Finch MD    Presenting Problem/History of Present Illness:  Weakness, near syncope.    Discharge Diagnoses:  Active Hospital Problems    Diagnosis   • **Near syncope   • Proctitis   • Bacterial UTI     Streptococcus agalactiae 5/19/2020     • Essential hypertension   • Nausea with vomiting   • Volume depletion   • Lactic acidosis, slight (2.1)   • Acute renal failure (ARF) (CMS/[facility])   • Dementia (CMS/[facility])   • Legal blindness   • Normocytic anemia     Chief Complaint on Day of Discharge: No complaints offered.    History of Present Illness on Day of Discharge:   Lying in bed.  Nursing staff feeding patient.  Eating without abdominal pain or nausea or vomiting.  Patient does not answer questions appropriately.  She tells me her name and her daughter's name.  She will follow commands.  She denies chest pain, palpitations, or shortness of breath.  No oxygen in use.    Consults: None    Procedures Performed: None    Pertinent Test Results:   Laboratory Data:    Results from last 7 days   Lab Units 05/21/20  0603 05/20/20  0817 05/19/20  1125   WBC 10*3/mm3 5.10 4.63 5.84   HEMOGLOBIN g/dL 8.6* 8.3* 9.1*   HEMATOCRIT % 25.3* 24.8* 27.5*   PLATELETS 10*3/mm3 200 190 202        Results from last 7 days   Lab Units 05/22/20  0611 05/21/20  0603 05/20/20  0557 05/19/20  1125   SODIUM mmol/L 138 138 139 135*   POTASSIUM mmol/L 3.6 3.9 4.5 4.0   CHLORIDE mmol/L 105 107 107 100   CO2 mmol/L 23.0 21.0* 21.0* 21.0*   BUN mg/dL 13 18 28* 38*   CREATININE mg/dL 1.12* 1.14* 1.20* 1.62*   CALCIUM mg/dL 9.0 8.8 8.7 9.0   BILIRUBIN mg/dL  --   --   --  0.3   ALK PHOS U/L  --   --   --  72   ALT (SGPT) U/L  --   --   --  7   AST (SGOT) U/L  --   --   --  15   GLUCOSE mg/dL 107* 103* 94 118*     Lab Results (all)     Procedure Component Value Units Date/Time     Basic Metabolic Panel [[id number]]  (Abnormal) Collected:  05/22/20 0611    Specimen:  Blood Updated:  05/22/20 0635     Glucose 107 mg/dL      BUN 13 mg/dL      Creatinine 1.12 mg/dL      Sodium 138 mmol/L      Potassium 3.6 mmol/L      Chloride 105 mmol/L      CO2 23.0 mmol/L      Calcium 9.0 mg/dL      eGFR Non African Amer 46 mL/min/1.73      BUN/Creatinine Ratio 11.6     Anion Gap 10.0 mmol/L     Narrative:       GFR Normal >60  Chronic Kidney Disease <60  Kidney Failure <15      SARS-COV-2 PCR ([place] IN-HOUSE PERFORMED), NP SWAB IN TRANSPORT MEDIA - Swab, Nasopharynx [[id number]]  (Normal) Collected:  05/21/20 1415    Specimen:  Swab from Nasopharynx Updated:  05/22/20 0233     COVID19 Not Detected    Urine Culture - Urine, Urine, Catheter In/Out [[id number]]  (Abnormal) Collected:  05/19/20 1158    Specimen:  Urine, Catheter In/Out Updated:  05/21/20 1456     Urine Culture <25,000 CFU/mL Streptococcus agalactiae (Group B)     Comment:   This organism is considered to be universally susceptible to penicillin.  No further antibiotic testing will be performed.        STREP GROUPING B    Blood Culture - Blood, Arm, Left [[id number]] Collected:  05/19/20 1125    Specimen:  Blood from Arm, Left Updated:  05/21/20 1345     Blood Culture No growth at 2 days    Blood Culture - Blood, Arm, Right [[id number]] Collected:  05/19/20 1155    Specimen:  Blood from Arm, Right Updated:  05/21/20 1315     Blood Culture No growth at 2 days    Basic Metabolic Panel [[id number]]  (Abnormal) Collected:  05/21/20 0603    Specimen:  Blood Updated:  05/21/20 0717     Glucose 103 mg/dL      BUN 18 mg/dL      Creatinine 1.14 mg/dL      Sodium 138 mmol/L      Potassium 3.9 mmol/L      Chloride 107 mmol/L      CO2 21.0 mmol/L      Calcium 8.8 mg/dL      eGFR Non African Amer 45 mL/min/1.73      BUN/Creatinine Ratio 15.8     Anion Gap 10.0 mmol/L     Narrative:       GFR Normal >60  Chronic Kidney Disease <60  Kidney Failure <15       CBC (No Diff) [[id number]]  (Abnormal) Collected:  05/21/20 0603    Specimen:  Blood Updated:  05/21/20 0705     WBC 5.10 10*3/mm3      RBC 2.75 10*6/mm3      Hemoglobin 8.6 g/dL      Hematocrit 25.3 %      MCV 92.0 fL      MCH 31.3 pg      MCHC 34.0 g/dL      RDW 15.7 %      RDW-SD 53.1 fl      MPV 11.4 fL      Platelets 200 10*3/mm3     CBC (No Diff) [[id number]]  (Abnormal) Collected:  05/20/20 0817    Specimen:  Blood Updated:  05/20/20 0841     WBC 4.63 10*3/mm3      RBC 2.70 10*6/mm3      Hemoglobin 8.3 g/dL      Hematocrit 24.8 %      MCV 91.9 fL      MCH 30.7 pg      MCHC 33.5 g/dL      RDW 15.7 %      RDW-SD 53.1 fl      MPV 11.2 fL      Platelets 190 10*3/mm3     Basic Metabolic Panel [[id number]]  (Abnormal) Collected:  05/20/20 0557    Specimen:  Blood Updated:  05/20/20 0710     Glucose 94 mg/dL      BUN 28 mg/dL      Creatinine 1.20 mg/dL      Sodium 139 mmol/L      Potassium 4.5 mmol/L      Chloride 107 mmol/L      CO2 21.0 mmol/L      Calcium 8.7 mg/dL      eGFR Non African Amer 42 mL/min/1.73      BUN/Creatinine Ratio 23.3     Anion Gap 11.0 mmol/L     Narrative:       GFR Normal >60  Chronic Kidney Disease <60  Kidney Failure <15      Vitamin B12 [[id number]]  (Normal) Collected:  05/19/20 1125    Specimen:  Blood Updated:  05/20/20 0137     Vitamin B-12 283 pg/mL     Narrative:       Results may be falsely increased if patient taking Biotin.      Ferritin [[id number]]  (Abnormal) Collected:  05/19/20 1125    Specimen:  Blood Updated:  05/19/20 1619     Ferritin 179.30 ng/mL     Narrative:       Results may be falsely decreased if patient taking Biotin.      Lactic Acid, Reflex [[id number]]  (Normal) Collected:  05/19/20 1547    Specimen:  Blood Updated:  05/19/20 1615     Lactate 1.4 mmol/L     Iron Profile [[id number]]  (Abnormal) Collected:  05/19/20 1125    Specimen:  Blood Updated:  05/19/20 1614     Iron 61 mcg/dL      Iron Saturation 22 %      Transferrin 183 mg/dL      TIBC 273 mcg/dL     Lactic  Acid, Reflex Timer (This will reflex a repeat order 3-3:15 hours after ordered.) [[id number]] Collected:  05/19/20 1125    Specimen:  Blood Updated:  05/19/20 1530     Hold Tube Hold for add-ons.     Comment: Auto resulted.       Urinalysis, Microscopic Only - Urine, Catheter In/Out [[id number]]  (Abnormal) Collected:  05/19/20 1158    Specimen:  Urine, Catheter In/Out Updated:  05/19/20 1236     RBC, UA 6-12 /HPF      WBC, UA 31-50 /HPF      Bacteria, UA 2+ /HPF      Squamous Epithelial Cells, UA 7-12 /HPF      Hyaline Casts, UA None Seen /LPF      Methodology Automated Microscopy    Urinalysis With Culture If Indicated - Urine, Catheter In/Out [[id number]]  (Abnormal) Collected:  05/19/20 1158    Specimen:  Urine, Catheter In/Out Updated:  05/19/20 1219     Color, UA Yellow     Appearance, UA Clear     pH, UA 6.0     Specific Gravity, UA 1.015     Glucose, UA Negative     Ketones, UA Negative     Bilirubin, UA Negative     Blood, UA Moderate (2+)     Protein, UA Negative     Leuk Esterase, UA Large (3+)     Nitrite, UA Negative     Urobilinogen, UA 0.2 E.U./dL    Lactic Acid, Plasma [[id number]]  (Abnormal) Collected:  05/19/20 1125    Specimen:  Blood Updated:  05/19/20 1215     Lactate 2.1 mmol/L     Procalcitonin [[id number]]  (Abnormal) Collected:  05/19/20 1125    Specimen:  Blood Updated:  05/19/20 1204     Procalcitonin 0.07 ng/mL     Narrative:       As a Marker for Sepsis (Non-Neonates):   1. <0.5 ng/mL represents a low risk of severe sepsis and/or septic shock.  1. >2 ng/mL represents a high risk of severe sepsis and/or septic shock.    As a Marker for Lower Respiratory Tract Infections that require antibiotic therapy:  PCT on Admission     Antibiotic Therapy             6-12 Hrs later  > 0.5                Strongly Recommended            >0.25 - <0.5         Recommended  0.1 - 0.25           Discouraged                   Remeasure/reassess PCT  <0.1                 Strongly Discouraged           "Remeasure/reassess PCT      As 28 day mortality risk marker: \"Change in Procalcitonin Result\" (> 80 % or <=80 %) if Day 0 (or Day 1) and Day 4 values are available. Refer to [email or web address]   Change in PCT <=80 %   A decrease of PCT levels below or equal to 80 % defines a positive change in PCT test result representing a higher risk for 28-day all-cause mortality of patients diagnosed with severe sepsis or septic shock.  Change in PCT > 80 %   A decrease of PCT levels of more than 80 % defines a negative change in PCT result representing a lower risk for 28-day all-cause mortality of patients diagnosed with severe sepsis or septic shock.                Results may be falsely decreased if patient taking Biotin.     Comprehensive Metabolic Panel [[id number]]  (Abnormal) Collected:  05/19/20 1125    Specimen:  Blood Updated:  05/19/20 1201     Glucose 118 mg/dL      BUN 38 mg/dL      Creatinine 1.62 mg/dL      Sodium 135 mmol/L      Potassium 4.0 mmol/L      Chloride 100 mmol/L      CO2 21.0 mmol/L      Calcium 9.0 mg/dL      Total Protein 7.1 g/dL      Albumin 3.80 g/dL      ALT (SGPT) 7 U/L      AST (SGOT) 15 U/L      Alkaline Phosphatase 72 U/L      Total Bilirubin 0.3 mg/dL      eGFR Non African Amer 30 mL/min/1.73      Globulin 3.3 gm/dL      A/G Ratio 1.2 g/dL      BUN/Creatinine Ratio 23.5     Anion Gap 14.0 mmol/L     Narrative:       GFR Normal >60  Chronic Kidney Disease <60  Kidney Failure <15      Gray Top - Ice [[id number]] Collected:  05/19/20 1125    Specimen:  Blood Updated:  05/19/20 1158     Extra Tube --    Lipase [[id number]]  (Normal) Collected:  05/19/20 1125    Specimen:  Blood Updated:  05/19/20 1156     Lipase 53 U/L     Troponin [[id number]]  (Normal) Collected:  05/19/20 1125    Specimen:  Blood Updated:  05/19/20 1156     Troponin T <0.010 ng/mL     Narrative:       Troponin T Reference Range:  <= 0.03 ng/mL-   Negative for AMI  >0.03 ng/mL-     Abnormal for myocardial " necrosis.  Clinicians would have to utilize clinical acumen, EKG, Troponin and serial changes to determine if it is an Acute Myocardial Infarction or myocardial injury due to an underlying chronic condition.       Results may be falsely decreased if patient taking Biotin.      CBC Auto Differential [[id number]]  (Abnormal) Collected:  05/19/20 1125    Specimen:  Blood Updated:  05/19/20 1140     WBC 5.84 10*3/mm3      RBC 2.95 10*6/mm3      Hemoglobin 9.1 g/dL      Hematocrit 27.5 %      MCV 93.2 fL      MCH 30.8 pg      MCHC 33.1 g/dL      RDW 15.7 %      RDW-SD 54.0 fl      MPV 11.0 fL      Platelets 202 10*3/mm3      Neutrophil % 66.5 %      Lymphocyte % 21.6 %      Monocyte % 8.4 %      Eosinophil % 2.9 %      Basophil % 0.3 %      Immature Grans % 0.3 %      Neutrophils, Absolute 3.88 10*3/mm3      Lymphocytes, Absolute 1.26 10*3/mm3      Monocytes, Absolute 0.49 10*3/mm3      Eosinophils, Absolute 0.17 10*3/mm3      Basophils, Absolute 0.02 10*3/mm3      Immature Grans, Absolute 0.02 10*3/mm3      nRBC 0.0 /100 WBC         Culture Data:   Blood Culture   Date Value Ref Range Status   05/19/2020 No growth at 2 days  Preliminary   05/19/2020 No growth at 2 days  Preliminary     Urine Culture   Date Value Ref Range Status   05/19/2020 <25,000 CFU/mL Streptococcus agalactiae (Group B) (A)  Final     Comment:       This organism is considered to be universally susceptible to penicillin.  No further antibiotic testing will be performed.     Imaging Results (All)     Procedure Component Value Units Date/Time    CT Abdomen Pelvis Without Contrast [[id number]] Collected:  05/19/20 1216     Updated:  05/19/20 1227    Narrative:       CT ABDOMEN PELVIS WO CONTRAST- 5/19/2020 12:07 PM CDT     HISTORY: Abd pain, fever, abscess suspected      COMPARISON: None      DOSE LENGTH PRODUCT: 230 mGy cm. Automated exposure control was also  utilized to decrease patient radiation dose.     TECHNIQUE: Noncontrast enhanced images of the  abdomen and pelvis  obtained without oral contrast. Multiplanar reformatted images were  provided for review.      FINDINGS:   LOWER CHEST: Mild scarring in both bases. No consolidation.      LIVER: No focal liver lesion within limits of a noncontrast study.      BILIARY SYSTEM: The gallbladder has been removed. There is no evidence  of biliary ductal dilation.      PANCREAS: Diffuse atrophy. No obvious focal lesion.      SPLEEN: Normal size.      KIDNEYS: Bilateral kidneys are grossly unremarkable. No hydronephrosis.  The ureters are decompressed and normal in appearance.     ADRENALS: Unremarkable.     RETROPERITONEUM: No mass, lymphadenopathy or hemorrhage.      GI TRACT: Stomach is unremarkable. The small bowel is nondilated. There  appears to be prior appendectomy. The colon is mostly decompressed.  Prominent rectal stool with mild distention. Notable circumferential  wall thickening at the rectum with adjacent inflammatory stranding in  the mesorectal fat. No extraluminal collection/abscess identified.  Underlying sigmoid diverticulosis.     OTHER: No intraperitoneal free fluid or free air. No mesenteric  adenopathy. Rectus diastases. Age indeterminant wedge compression  deformities at L1 and L2.      PELVIS: No mass lesion, fluid collection or significant lymphadenopathy  is seen in the pelvis. The urinary bladder is normal in appearance.       Impression:       1. Proctitis vs stercoral colitis. Proctitis is favored, as the rectal  is only slightly distended. Associated inflammatory stranding in the  mesorectal fat. No visualized perforation or evidence of bowel  obstruction.  This report was finalized on 05/19/2020 12:24 by Dr Constantin Sanchez, .        Hospital Course  Patient is a 89 y.o. female presented to [facility] emergency room 5/19/2020 from [facility] after an episode of weakness and near syncopal at the skilled facility.  Patient had been reporting some nausea,  vomiting and generalized abdominal pain.  Urinalysis 31-50 WBC, 2+ bacteria, WBC 5.84, hemoglobin 9.1, hematocrit 27.5, creatinine 1.62, sodium 135. CT scan of the abdomen reported proctitis versus stercoral colitis.  Proctitis favored as the rectal is only slightly distended.  Inflammatory stranding in mesorectal fat.  No evidence of bowel obstruction.  She received lactated Ringer's fluid bolus and Invanz in the emergency room.    She was admitted to the medical floor with near syncopal episode, weakness nausea vomiting abdominal pain, concern for urinary tract infection and proctitis.  Invanz continued.  Patient did not appear septic.  Lactic acid 2.1.  Patient was not tachycardic and blood pressure normal.  No leukocytosis noted.  Creatinine 0.9 February 2020.  Creatinine 1.6 on admission.  Hydrochlorothiazide and lisinopril held.  SCDs placed for deep vein thrombosis prophylaxis.    Acute kidney injury with creatinine 1.62 on admission.  She was hydrated with IV fluids and creatinine returned to baseline 1.12.  Creatinine was noted be 0.9 February 2020.  Lisinopril resumed when creatinine returned to normal as blood pressure trended upward.  Hydrochlorothiazide not resumed during hospital stay or discharge.    Urine culture positive for Streptococcus agalactiae.  She received Invanz during hospital stay and was transitioned to Macrobid 100 mg twice daily for an additional 5 days beginning 5/22/2020.    CT scan of the abdomen noted proctitis versus stercoral colitis.  Proctitis favored.  Patient denied any abdominal pain.  She had a bowel movement on admission.  Invanz continued as it would cover both UTI and possible colitis.    History of hypertension.  Blood pressure remained elevated.  Lisinopril resumed.  Hydrochlorothiazide not continued on admission or at discharge.  Norvasc started and uptitrated to 10 mg daily with better blood pressure control.  Blood pressure 157/60 at discharge.    Chronic normocytic  "anemia remained stable with hemoglobin 8.6/hematocrit 25.3.  Iron 61, saturation 22%.  TIBC 273.    Physical therapy and Occupational Therapy consulted.    Patient with confusion at baseline.    On 5/22/2020 she is stable for discharge back to [facility].  She will complete Macrobid 100 mg twice daily for 5 days.  Norvasc added for blood pressure control.  Hydrochlorothiazide discontinued.  She is followed by Dr. Finch.    Physical Exam on Discharge:  /60 (BP Location: Right arm, Patient Position: Lying)   Pulse 63   Temp 98.3 °F (36.8 °C) (Oral)   Resp 16   Ht 157.5 cm (62\")   Wt 67.1 kg (148 lb)   SpO2 96%   BMI 27.07 kg/m²   Physical Exam   Constitutional:   Lying in bed.  Staff eating breakfast.  No oxygen in use.  No complaints voiced.   HENT:   Head: Normocephalic and atraumatic.   Eyes: Pupils are equal, round, and reactive to light. EOM are normal.   Blind   Neck: Normal range of motion. Neck supple.   Cardiovascular: Normal rate, regular rhythm, normal heart sounds and intact distal pulses. Exam reveals no gallop and no friction rub.   No murmur heard.  Pulmonary/Chest: She has no wheezes. She has no rales.   No oxygen in use.   Abdominal: Soft. Bowel sounds are normal. She exhibits no distension. There is no tenderness.   Genitourinary:   Genitourinary Comments: Brief in place.   Musculoskeletal: Normal range of motion. She exhibits no edema, tenderness or deformity.   SCDs bilateral lower extremities   Neurological:   Oriented to person only.  Follows commands.  Does not answer questions appropriately.   Skin: No rash noted. No erythema. No pallor.   Psychiatric:   Impaired thought     Condition on Discharge: Stable    Discharge Disposition: [facility]    Discharge Diet:   Diet Instructions     Advance Diet As Tolerated          Activity at Discharge:   Activity Instructions     Activity as Tolerated          Discharge Care Plan / Instructions:   1.  Norvasc 10 " mg orally daily  2.  Discontinue hydrochlorothiazide  3.  Macrobid 100 mg twice daily for 5 days.  Begin 5/22/2020.  4.  For returning or worsening weakness with near syncope seek medical attention.    Discharge Medications:     Discharge Medications      New Medications      Instructions Start Date   amLODIPine 10 MG tablet  Commonly known as:  NORVASC   10 mg, Oral, Every 24 Hours Scheduled   Start Date:  May 23, 2020     ferrous sulfate 325 (65 FE) MG tablet   325 mg, Oral, Daily With Breakfast   Start Date:  May 23, 2020     nitrofurantoin (macrocrystal-monohydrate) 100 MG capsule  Commonly known as:  MACROBID   100 mg, Oral, Every 12 Hours Scheduled.  For 5 days begin 5/22/2020.         Continue These Medications      Instructions Start Date   atorvastatin 10 MG tablet  Commonly known as:  LIPITOR   10 mg, Oral, Daily      esomeprazole 20 MG capsule  Commonly known as:  nexIUM   20 mg, Oral, Every Morning Before Breakfast      lisinopril 20 MG tablet  Commonly known as:  PRINIVIL,ZESTRIL   20 mg, Oral, Daily      metoprolol succinate  MG 24 hr tablet  Commonly known as:  TOPROL-XL   100 mg, Oral, Daily      Mirabegron ER 50 MG tablet sustained-release 24 hour 24 hr tablet  Commonly known as:  MYRBETRIQ   50 mg, Oral, Daily      QUEtiapine 25 MG tablet  Commonly known as:  SEROquel   25 mg, Oral, 3 Times Daily      UTI-Stat liquid   30 mL, Oral, 2 Times Daily         Stop These Medications    hydroCHLOROthiazide 12.5 MG capsule  Commonly known as:  MICROZIDE          Follow-up Appointments:   Follow-up Information     Nunu Finch MD Follow up.    Specialty:  Family Medicine  Why:  Follows at [facility]  Contact information:  [address] [address] [phone]                 Test Results Pending at Discharge: None    The above documentation resulted from a face-to-face encounter by me Sandi ROSALES, [facility].    Sandi Arreola,  CONNIE  05/22/20  09:53    Time: This discharge process required 35 minutes for completion.    Plan discussed with Dr. Tierney    Time spent in face-to-face evaluation, chart review, planning and education 35 minutes.    I personally evaluated and examined the patient in conjunction with CONNIE Philip and agree with the assessment, treatment plan, and disposition of the patient as recorded by her. My history, exam, and further recommendations are:     Doing well.  No new events.  Her renal function is back to baseline.  She has Streptococcus agalactiae on culture with a low-grade colony count.  She is allergic to penicillin antibiotics.  I think that we can complete a few more days of nitrofurantoin at this point.  I am still not convinced that she ever had any significant colitis.  She is eating and drinking well per the nursing staff.  We have adjusted her antihypertensives a bit.  She can go back to her facility today.  She had a negative screening coronavirus test yesterday.    Teddy Tierney,   05/22/20  10:15

## 2020-05-22 NOTE — DISCHARGE PLACEMENT REQUEST
Discharge Summary      Teddy Tierney DO at 05/22/20 0940              [facility] Medicine Services  DISCHARGE SUMMARY     Date of Admission: 5/19/2020  Date of Discharge:  5/22/2020  Primary Care Physician: Nunu Finch MD    Presenting Problem/History of Present Illness:  Weakness, near syncope.    Discharge Diagnoses:  Active Hospital Problems    Diagnosis   • **Near syncope   • Proctitis   • Bacterial UTI     Streptococcus agalactiae 5/19/2020     • Essential hypertension   • Nausea with vomiting   • Volume depletion   • Lactic acidosis, slight (2.1)   • Acute renal failure (ARF) (CMS/[facility])   • Dementia (CMS/[facility])   • Legal blindness   • Normocytic anemia     Chief Complaint on Day of Discharge: No complaints offered.    History of Present Illness on Day of Discharge:   Lying in bed.  Nursing staff feeding patient.  Eating without abdominal pain or nausea or vomiting.  Patient does not answer questions appropriately.  She tells me her name and her daughter's name.  She will follow commands.  She denies chest pain, palpitations, or shortness of breath.  No oxygen in use.    Consults: None    Procedures Performed: None    Pertinent Test Results:   Laboratory Data:    Results from last 7 days   Lab Units 05/21/20  0603 05/20/20  0817 05/19/20  1125   WBC 10*3/mm3 5.10 4.63 5.84   HEMOGLOBIN g/dL 8.6* 8.3* 9.1*   HEMATOCRIT % 25.3* 24.8* 27.5*   PLATELETS 10*3/mm3 200 190 202        Results from last 7 days   Lab Units 05/22/20  0611 05/21/20  0603 05/20/20  0557 05/19/20  1125   SODIUM mmol/L 138 138 139 135*   POTASSIUM mmol/L 3.6 3.9 4.5 4.0   CHLORIDE mmol/L 105 107 107 100   CO2 mmol/L 23.0 21.0* 21.0* 21.0*   BUN mg/dL 13 18 28* 38*   CREATININE mg/dL 1.12* 1.14* 1.20* 1.62*   CALCIUM mg/dL 9.0 8.8 8.7 9.0   BILIRUBIN mg/dL  --   --   --  0.3   ALK PHOS U/L  --   --   --  72   ALT (SGPT) U/L  --   --   --  7   AST (SGOT) U/L  --   --   --  15   GLUCOSE mg/dL 107* 103*  94 118*     Lab Results (all)     Procedure Component Value Units Date/Time    Basic Metabolic Panel [[id number]]  (Abnormal) Collected:  05/22/20 0611    Specimen:  Blood Updated:  05/22/20 0635     Glucose 107 mg/dL      BUN 13 mg/dL      Creatinine 1.12 mg/dL      Sodium 138 mmol/L      Potassium 3.6 mmol/L      Chloride 105 mmol/L      CO2 23.0 mmol/L      Calcium 9.0 mg/dL      eGFR Non African Amer 46 mL/min/1.73      BUN/Creatinine Ratio 11.6     Anion Gap 10.0 mmol/L     Narrative:       GFR Normal >60  Chronic Kidney Disease <60  Kidney Failure <15      SARS-COV-2 PCR ([facility] IN-HOUSE PERFORMED), NP SWAB IN TRANSPORT MEDIA - Swab, Nasopharynx [[id number]]  (Normal) Collected:  05/21/20 1415    Specimen:  Swab from Nasopharynx Updated:  05/22/20 0233     COVID19 Not Detected    Urine Culture - Urine, Urine, Catheter In/Out [[id number]]  (Abnormal) Collected:  05/19/20 1158    Specimen:  Urine, Catheter In/Out Updated:  05/21/20 1456     Urine Culture <25,000 CFU/mL Streptococcus agalactiae (Group B)     Comment:   This organism is considered to be universally susceptible to penicillin.  No further antibiotic testing will be performed.        STREP GROUPING B    Blood Culture - Blood, Arm, Left [[id number]] Collected:  05/19/20 1125    Specimen:  Blood from Arm, Left Updated:  05/21/20 1345     Blood Culture No growth at 2 days    Blood Culture - Blood, Arm, Right [[id number]] Collected:  05/19/20 1155    Specimen:  Blood from Arm, Right Updated:  05/21/20 1315     Blood Culture No growth at 2 days    Basic Metabolic Panel [[id number]]  (Abnormal) Collected:  05/21/20 0603    Specimen:  Blood Updated:  05/21/20 0717     Glucose 103 mg/dL      BUN 18 mg/dL      Creatinine 1.14 mg/dL      Sodium 138 mmol/L      Potassium 3.9 mmol/L      Chloride 107 mmol/L      CO2 21.0 mmol/L      Calcium 8.8 mg/dL      eGFR Non African Amer 45 mL/min/1.73      BUN/Creatinine Ratio 15.8     Anion Gap 10.0 mmol/L     Narrative:        GFR Normal >60  Chronic Kidney Disease <60  Kidney Failure <15      CBC (No Diff) [[id number]]  (Abnormal) Collected:  05/21/20 0603    Specimen:  Blood Updated:  05/21/20 0705     WBC 5.10 10*3/mm3      RBC 2.75 10*6/mm3      Hemoglobin 8.6 g/dL      Hematocrit 25.3 %      MCV 92.0 fL      MCH 31.3 pg      MCHC 34.0 g/dL      RDW 15.7 %      RDW-SD 53.1 fl      MPV 11.4 fL      Platelets 200 10*3/mm3     CBC (No Diff) [[id number]]  (Abnormal) Collected:  05/20/20 0817    Specimen:  Blood Updated:  05/20/20 0841     WBC 4.63 10*3/mm3      RBC 2.70 10*6/mm3      Hemoglobin 8.3 g/dL      Hematocrit 24.8 %      MCV 91.9 fL      MCH 30.7 pg      MCHC 33.5 g/dL      RDW 15.7 %      RDW-SD 53.1 fl      MPV 11.2 fL      Platelets 190 10*3/mm3     Basic Metabolic Panel [[id number]]  (Abnormal) Collected:  05/20/20 0557    Specimen:  Blood Updated:  05/20/20 0710     Glucose 94 mg/dL      BUN 28 mg/dL      Creatinine 1.20 mg/dL      Sodium 139 mmol/L      Potassium 4.5 mmol/L      Chloride 107 mmol/L      CO2 21.0 mmol/L      Calcium 8.7 mg/dL      eGFR Non African Amer 42 mL/min/1.73      BUN/Creatinine Ratio 23.3     Anion Gap 11.0 mmol/L     Narrative:       GFR Normal >60  Chronic Kidney Disease <60  Kidney Failure <15      Vitamin B12 [[id number]]  (Normal) Collected:  05/19/20 1125    Specimen:  Blood Updated:  05/20/20 0137     Vitamin B-12 283 pg/mL     Narrative:       Results may be falsely increased if patient taking Biotin.      Ferritin [[id number]]  (Abnormal) Collected:  05/19/20 1125    Specimen:  Blood Updated:  05/19/20 1619     Ferritin 179.30 ng/mL     Narrative:       Results may be falsely decreased if patient taking Biotin.      Lactic Acid, Reflex [[id number]]  (Normal) Collected:  05/19/20 1547    Specimen:  Blood Updated:  05/19/20 1615     Lactate 1.4 mmol/L     Iron Profile [[id number]]  (Abnormal) Collected:  05/19/20 1125    Specimen:  Blood Updated:  05/19/20 1614     Iron 61 mcg/dL      Iron  Saturation 22 %      Transferrin 183 mg/dL      TIBC 273 mcg/dL     Lactic Acid, Reflex Timer (This will reflex a repeat order 3-3:15 hours after ordered.) [[id number]] Collected:  05/19/20 1125    Specimen:  Blood Updated:  05/19/20 1530     Hold Tube Hold for add-ons.     Comment: Auto resulted.       Urinalysis, Microscopic Only - Urine, Catheter In/Out [[id number]]  (Abnormal) Collected:  05/19/20 1158    Specimen:  Urine, Catheter In/Out Updated:  05/19/20 1236     RBC, UA 6-12 /HPF      WBC, UA 31-50 /HPF      Bacteria, UA 2+ /HPF      Squamous Epithelial Cells, UA 7-12 /HPF      Hyaline Casts, UA None Seen /LPF      Methodology Automated Microscopy    Urinalysis With Culture If Indicated - Urine, Catheter In/Out [[id number]]  (Abnormal) Collected:  05/19/20 1158    Specimen:  Urine, Catheter In/Out Updated:  05/19/20 1219     Color, UA Yellow     Appearance, UA Clear     pH, UA 6.0     Specific Gravity, UA 1.015     Glucose, UA Negative     Ketones, UA Negative     Bilirubin, UA Negative     Blood, UA Moderate (2+)     Protein, UA Negative     Leuk Esterase, UA Large (3+)     Nitrite, UA Negative     Urobilinogen, UA 0.2 E.U./dL    Lactic Acid, Plasma [[id number]]  (Abnormal) Collected:  05/19/20 1125    Specimen:  Blood Updated:  05/19/20 1215     Lactate 2.1 mmol/L     Procalcitonin [[id number]]  (Abnormal) Collected:  05/19/20 1125    Specimen:  Blood Updated:  05/19/20 1204     Procalcitonin 0.07 ng/mL     Narrative:       As a Marker for Sepsis (Non-Neonates):   1. <0.5 ng/mL represents a low risk of severe sepsis and/or septic shock.  1. >2 ng/mL represents a high risk of severe sepsis and/or septic shock.    As a Marker for Lower Respiratory Tract Infections that require antibiotic therapy:  PCT on Admission     Antibiotic Therapy             6-12 Hrs later  > 0.5                Strongly Recommended            >0.25 - <0.5         Recommended  0.1 - 0.25           Discouraged                    "Remeasure/reassess PCT  <0.1                 Strongly Discouraged          Remeasure/reassess PCT      As 28 day mortality risk marker: \"Change in Procalcitonin Result\" (> 80 % or <=80 %) if Day 0 (or Day 1) and Day 4 values are available. Refer to [email or web address]   Change in PCT <=80 %   A decrease of PCT levels below or equal to 80 % defines a positive change in PCT test result representing a higher risk for 28-day all-cause mortality of patients diagnosed with severe sepsis or septic shock.  Change in PCT > 80 %   A decrease of PCT levels of more than 80 % defines a negative change in PCT result representing a lower risk for 28-day all-cause mortality of patients diagnosed with severe sepsis or septic shock.                Results may be falsely decreased if patient taking Biotin.     Comprehensive Metabolic Panel [[id number]]  (Abnormal) Collected:  05/19/20 1125    Specimen:  Blood Updated:  05/19/20 1201     Glucose 118 mg/dL      BUN 38 mg/dL      Creatinine 1.62 mg/dL      Sodium 135 mmol/L      Potassium 4.0 mmol/L      Chloride 100 mmol/L      CO2 21.0 mmol/L      Calcium 9.0 mg/dL      Total Protein 7.1 g/dL      Albumin 3.80 g/dL      ALT (SGPT) 7 U/L      AST (SGOT) 15 U/L      Alkaline Phosphatase 72 U/L      Total Bilirubin 0.3 mg/dL      eGFR Non African Amer 30 mL/min/1.73      Globulin 3.3 gm/dL      A/G Ratio 1.2 g/dL      BUN/Creatinine Ratio 23.5     Anion Gap 14.0 mmol/L     Narrative:       GFR Normal >60  Chronic Kidney Disease <60  Kidney Failure <15      Gray Top - Ice [[id number]] Collected:  05/19/20 1125    Specimen:  Blood Updated:  05/19/20 1158     Extra Tube --    Lipase [[id number]]  (Normal) Collected:  05/19/20 1125    Specimen:  Blood Updated:  05/19/20 1156     Lipase 53 U/L     Troponin [[id number]]  (Normal) Collected:  05/19/20 1125    Specimen:  Blood Updated:  05/19/20 1156     Troponin T <0.010 ng/mL     Narrative:       Troponin T Reference Range:  <= 0.03 " ng/mL-   Negative for AMI  >0.03 ng/mL-     Abnormal for myocardial necrosis.  Clinicians would have to utilize clinical acumen, EKG, Troponin and serial changes to determine if it is an Acute Myocardial Infarction or myocardial injury due to an underlying chronic condition.       Results may be falsely decreased if patient taking Biotin.      CBC Auto Differential [[id number]]  (Abnormal) Collected:  05/19/20 1125    Specimen:  Blood Updated:  05/19/20 1140     WBC 5.84 10*3/mm3      RBC 2.95 10*6/mm3      Hemoglobin 9.1 g/dL      Hematocrit 27.5 %      MCV 93.2 fL      MCH 30.8 pg      MCHC 33.1 g/dL      RDW 15.7 %      RDW-SD 54.0 fl      MPV 11.0 fL      Platelets 202 10*3/mm3      Neutrophil % 66.5 %      Lymphocyte % 21.6 %      Monocyte % 8.4 %      Eosinophil % 2.9 %      Basophil % 0.3 %      Immature Grans % 0.3 %      Neutrophils, Absolute 3.88 10*3/mm3      Lymphocytes, Absolute 1.26 10*3/mm3      Monocytes, Absolute 0.49 10*3/mm3      Eosinophils, Absolute 0.17 10*3/mm3      Basophils, Absolute 0.02 10*3/mm3      Immature Grans, Absolute 0.02 10*3/mm3      nRBC 0.0 /100 WBC         Culture Data:   Blood Culture   Date Value Ref Range Status   05/19/2020 No growth at 2 days  Preliminary   05/19/2020 No growth at 2 days  Preliminary     Urine Culture   Date Value Ref Range Status   05/19/2020 <25,000 CFU/mL Streptococcus agalactiae (Group B) (A)  Final     Comment:       This organism is considered to be universally susceptible to penicillin.  No further antibiotic testing will be performed.     Imaging Results (All)     Procedure Component Value Units Date/Time    CT Abdomen Pelvis Without Contrast [[id number]] Collected:  05/19/20 1216     Updated:  05/19/20 1227    Narrative:       CT ABDOMEN PELVIS WO CONTRAST- 5/19/2020 12:07 PM CDT     HISTORY: Abd pain, fever, abscess suspected      COMPARISON: None      DOSE LENGTH PRODUCT: 230 mGy cm. Automated exposure control was also  utilized to decrease  patient radiation dose.     TECHNIQUE: Noncontrast enhanced images of the abdomen and pelvis  obtained without oral contrast. Multiplanar reformatted images were  provided for review.      FINDINGS:   LOWER CHEST: Mild scarring in both bases. No consolidation.      LIVER: No focal liver lesion within limits of a noncontrast study.      BILIARY SYSTEM: The gallbladder has been removed. There is no evidence  of biliary ductal dilation.      PANCREAS: Diffuse atrophy. No obvious focal lesion.      SPLEEN: Normal size.      KIDNEYS: Bilateral kidneys are grossly unremarkable. No hydronephrosis.  The ureters are decompressed and normal in appearance.     ADRENALS: Unremarkable.     RETROPERITONEUM: No mass, lymphadenopathy or hemorrhage.      GI TRACT: Stomach is unremarkable. The small bowel is nondilated. There  appears to be prior appendectomy. The colon is mostly decompressed.  Prominent rectal stool with mild distention. Notable circumferential  wall thickening at the rectum with adjacent inflammatory stranding in  the mesorectal fat. No extraluminal collection/abscess identified.  Underlying sigmoid diverticulosis.     OTHER: No intraperitoneal free fluid or free air. No mesenteric  adenopathy. Rectus diastases. Age indeterminant wedge compression  deformities at L1 and L2.      PELVIS: No mass lesion, fluid collection or significant lymphadenopathy  is seen in the pelvis. The urinary bladder is normal in appearance.       Impression:       1. Proctitis vs stercoral colitis. Proctitis is favored, as the rectal  is only slightly distended. Associated inflammatory stranding in the  mesorectal fat. No visualized perforation or evidence of bowel  obstruction.  This report was finalized on 05/19/2020 12:24 by Dr Constantin Sanchez, .        Hospital Course  Patient is a 89 y.o. female presented to [facility] emergency room 5/19/2020 from [facility] after an episode of weakness and near  syncopal at the [facility] facility.  Patient had been reporting some nausea, vomiting and generalized abdominal pain.  Urinalysis 31-50 WBC, 2+ bacteria, WBC 5.84, hemoglobin 9.1, hematocrit 27.5, creatinine 1.62, sodium 135. CT scan of the abdomen reported proctitis versus stercoral colitis.  Proctitis favored as the rectal is only slightly distended.  Inflammatory stranding in mesorectal fat.  No evidence of bowel obstruction.  She received lactated Ringer's fluid bolus and Invanz in the emergency room.    She was admitted to the medical floor with near syncopal episode, weakness nausea vomiting abdominal pain, concern for urinary tract infection and proctitis.  Invanz continued.  Patient did not appear septic.  Lactic acid 2.1.  Patient was not tachycardic and blood pressure normal.  No leukocytosis noted.  Creatinine 0.9 February 2020.  Creatinine 1.6 on admission.  Hydrochlorothiazide and lisinopril held.  SCDs placed for deep vein thrombosis prophylaxis.    Acute kidney injury with creatinine 1.62 on admission.  She was hydrated with IV fluids and creatinine returned to baseline 1.12.  Creatinine was noted be 0.9 February 2020.  Lisinopril resumed when creatinine returned to normal as blood pressure trended upward.  Hydrochlorothiazide not resumed during hospital stay or discharge.    Urine culture positive for Streptococcus agalactiae.  She received Invanz during hospital stay and was transitioned to Macrobid 100 mg twice daily for an additional 5 days beginning 5/22/2020.    CT scan of the abdomen noted proctitis versus stercoral colitis.  Proctitis favored.  Patient denied any abdominal pain.  She had a bowel movement on admission.  Invanz continued as it would cover both UTI and possible colitis.    History of hypertension.  Blood pressure remained elevated.  Lisinopril resumed.  Hydrochlorothiazide not continued on admission or at discharge.  Norvasc started and uptitrated to 10 mg daily with better blood  "pressure control.  Blood pressure 157/60 at discharge.    Chronic normocytic anemia remained stable with hemoglobin 8.6/hematocrit 25.3.  Iron 61, saturation 22%.  TIBC 273.    Physical therapy and Occupational Therapy consulted.    Patient with confusion at baseline.    On 5/22/2020 she is stable for discharge back to [facility].  She will complete Macrobid 100 mg twice daily for 5 days.  Norvasc added for blood pressure control.  Hydrochlorothiazide discontinued.  She is followed by Dr. Finch.    Physical Exam on Discharge:  /60 (BP Location: Right arm, Patient Position: Lying)   Pulse 63   Temp 98.3 °F (36.8 °C) (Oral)   Resp 16   Ht 157.5 cm (62\")   Wt 67.1 kg (148 lb)   SpO2 96%   BMI 27.07 kg/m²    Physical Exam   Constitutional:   Lying in bed.  Staff eating breakfast.  No oxygen in use.  No complaints voiced.   HENT:   Head: Normocephalic and atraumatic.   Eyes: Pupils are equal, round, and reactive to light. EOM are normal.   Blind   Neck: Normal range of motion. Neck supple.   Cardiovascular: Normal rate, regular rhythm, normal heart sounds and intact distal pulses. Exam reveals no gallop and no friction rub.   No murmur heard.  Pulmonary/Chest: She has no wheezes. She has no rales.   No oxygen in use.   Abdominal: Soft. Bowel sounds are normal. She exhibits no distension. There is no tenderness.   Genitourinary:   Genitourinary Comments: Brief in place.   Musculoskeletal: Normal range of motion. She exhibits no edema, tenderness or deformity.   SCDs bilateral lower extremities   Neurological:   Oriented to person only.  Follows commands.  Does not answer questions appropriately.   Skin: No rash noted. No erythema. No pallor.   Psychiatric:   Impaired thought     Condition on Discharge: Stable    Discharge Disposition: [facility]    Discharge Diet:   Diet Instructions     Advance Diet As Tolerated          Activity at Discharge:   Activity Instructions     " Activity as Tolerated          Discharge Care Plan / Instructions:   1.  Norvasc 10 mg orally daily  2.  Discontinue hydrochlorothiazide  3.  Macrobid 100 mg twice daily for 5 days.  Begin 5/22/2020.  4.  For returning or worsening weakness with near syncope seek medical attention.    Discharge Medications:     Discharge Medications      New Medications      Instructions Start Date   amLODIPine 10 MG tablet  Commonly known as:  NORVASC   10 mg, Oral, Every 24 Hours Scheduled   Start Date:  May 23, 2020     ferrous sulfate 325 (65 FE) MG tablet   325 mg, Oral, Daily With Breakfast   Start Date:  May 23, 2020     nitrofurantoin (macrocrystal-monohydrate) 100 MG capsule  Commonly known as:  MACROBID   100 mg, Oral, Every 12 Hours Scheduled.  For 5 days begin 5/22/2020.         Continue These Medications      Instructions Start Date   atorvastatin 10 MG tablet  Commonly known as:  LIPITOR   10 mg, Oral, Daily      esomeprazole 20 MG capsule  Commonly known as:  nexIUM   20 mg, Oral, Every Morning Before Breakfast      lisinopril 20 MG tablet  Commonly known as:  PRINIVIL,ZESTRIL   20 mg, Oral, Daily      metoprolol succinate  MG 24 hr tablet  Commonly known as:  TOPROL-XL   100 mg, Oral, Daily      Mirabegron ER 50 MG tablet sustained-release 24 hour 24 hr tablet  Commonly known as:  MYRBETRIQ   50 mg, Oral, Daily      QUEtiapine 25 MG tablet  Commonly known as:  SEROquel   25 mg, Oral, 3 Times Daily      UTI-Stat liquid   30 mL, Oral, 2 Times Daily         Stop These Medications    hydroCHLOROthiazide 12.5 MG capsule  Commonly known as:  MICROZIDE          Follow-up Appointments:   Follow-up Information     Nunu Finch MD Follow up.    Specialty:  Family Medicine  Why:  Follows at [facility]  Contact information:  [address]  [place] [address] [phone]                 Test Results Pending at Discharge: None    The above documentation resulted from a face-to-face encounter by  me Sandi ROSALES, [facility].    CONNIE Neal  05/22/20  09:53    Time: This discharge process required 35 minutes for completion.    Plan discussed with Dr. Tierney    Time spent in face-to-face evaluation, chart review, planning and education 35 minutes.    I personally evaluated and examined the patient in conjunction with CONNIE Philip and agree with the assessment, treatment plan, and disposition of the patient as recorded by her. My history, exam, and further recommendations are:     Doing well.  No new events.  Her renal function is back to baseline.  She has Streptococcus agalactiae on culture with a low-grade colony count.  She is allergic to penicillin antibiotics.  I think that we can complete a few more days of nitrofurantoin at this point.  I am still not convinced that she ever had any significant colitis.  She is eating and drinking well per the nursing staff.  We have adjusted her antihypertensives a bit.  She can go back to her facility today.  She had a negative screening coronavirus test yesterday.    Teddy Tierney DO  05/22/20  10:15            Electronically signed by Teddy Tierney DO at 05/22/20 1016

## 2020-05-22 NOTE — PLAN OF CARE
Problem: Patient Care Overview  Goal: Plan of Care Review  Outcome: Ongoing (interventions implemented as appropriate)  Flowsheets (Taken 5/22/2020 0245)  Progress: no change  Plan of Care Reviewed With: patient  Outcome Summary: Pt still pleasantly confused; denies pain when asked; turn q2; voiding per bedpan; IID; IV abx; no other issues at this time; discharge planning continues.

## 2020-05-22 NOTE — PROGRESS NOTES
Continued Stay Note  [facility]     Patient Name: Vannesa Davis  MRN: [id number]  Today's Date: 5/22/2020    Admit Date: 5/19/2020    Discharge Plan     Row Name 05/22/20 1024       Plan    Plan  [facility]    Patient/Family in Agreement with Plan  yes    Final Discharge Disposition Code  04 - intermediate care facility    Final Note  Pt is returning to [facility] [phone] today at the  level. D/C summary has been faxed to [phone]. Spoke with pt's daughter, Tressa [phone], and pt will go by [facility] [phone].        Discharge Codes    No documentation.       Expected Discharge Date and Time     Expected Discharge Date Expected Discharge Time    May 22, 2020             CHIQUIS Gutierrez

## 2020-05-22 NOTE — DISCHARGE PLACEMENT REQUEST
Discharge Summary      ArreolaSandi easton, APRN at 05/22/20 0940              [facility] Medicine Services  DISCHARGE SUMMARY     Date of Admission: 5/19/2020  Date of Discharge:  5/22/2020  Primary Care Physician: Nunu Finch MD    Presenting Problem/History of Present Illness:  Near syncope [R55]     Discharge Diagnoses:  Active Hospital Problems    Diagnosis   • **Near syncope   • Proctitis   • Bacterial UTI     Streptococcus agalactiae 5/19/2020     • Essential hypertension   • Nausea with vomiting   • Volume depletion   • Lactic acidosis, slight (2.1)   • Acute renal failure (ARF) (CMS/[facility])   • Dementia (CMS/[facility])   • Legal blindness   • Normocytic anemia     Chief Complaint on Day of Discharge: No complaints offered.  History of Present Illness on Day of Discharge:   Lying in bed.  Nursing staff feeding patient.  Eating without abdominal pain or nausea or vomiting.  Patient does not answer questions appropriately.  She tells me her name and her daughter's name.  She will follow commands.  She denies chest pain, palpitations, or shortness of breath.  No oxygen in use.    Consults: None    Procedures Performed: None    Pertinent Test Results:   Laboratory Data:    Results from last 7 days   Lab Units 05/21/20  0603 05/20/20  0817 05/19/20  1125   WBC 10*3/mm3 5.10 4.63 5.84   HEMOGLOBIN g/dL 8.6* 8.3* 9.1*   HEMATOCRIT % 25.3* 24.8* 27.5*   PLATELETS 10*3/mm3 200 190 202        Results from last 7 days   Lab Units 05/22/20  0611 05/21/20  0603 05/20/20  0557 05/19/20  1125   SODIUM mmol/L 138 138 139 135*   POTASSIUM mmol/L 3.6 3.9 4.5 4.0   CHLORIDE mmol/L 105 107 107 100   CO2 mmol/L 23.0 21.0* 21.0* 21.0*   BUN mg/dL 13 18 28* 38*   CREATININE mg/dL 1.12* 1.14* 1.20* 1.62*   CALCIUM mg/dL 9.0 8.8 8.7 9.0   BILIRUBIN mg/dL  --   --   --  0.3   ALK PHOS U/L  --   --   --  72   ALT (SGPT) U/L  --   --   --  7   AST (SGOT) U/L  --   --   --  15   GLUCOSE mg/dL 107* 103*  94 118*     Lab Results (all)     Procedure Component Value Units Date/Time    Basic Metabolic Panel [[id number]]  (Abnormal) Collected:  05/22/20 0611    Specimen:  Blood Updated:  05/22/20 0635     Glucose 107 mg/dL      BUN 13 mg/dL      Creatinine 1.12 mg/dL      Sodium 138 mmol/L      Potassium 3.6 mmol/L      Chloride 105 mmol/L      CO2 23.0 mmol/L      Calcium 9.0 mg/dL      eGFR Non African Amer 46 mL/min/1.73      BUN/Creatinine Ratio 11.6     Anion Gap 10.0 mmol/L     Narrative:       GFR Normal >60  Chronic Kidney Disease <60  Kidney Failure <15      SARS-COV-2 PCR ([place] IN-HOUSE PERFORMED), NP SWAB IN TRANSPORT MEDIA - Swab, Nasopharynx [[id number]]  (Normal) Collected:  05/21/20 1415    Specimen:  Swab from Nasopharynx Updated:  05/22/20 0233     COVID19 Not Detected    Urine Culture - Urine, Urine, Catheter In/Out [[id number]]  (Abnormal) Collected:  05/19/20 1158    Specimen:  Urine, Catheter In/Out Updated:  05/21/20 1456     Urine Culture <25,000 CFU/mL Streptococcus agalactiae (Group B)     Comment:   This organism is considered to be universally susceptible to penicillin.  No further antibiotic testing will be performed.        STREP GROUPING B    Blood Culture - Blood, Arm, Left [[id number]] Collected:  05/19/20 1125    Specimen:  Blood from Arm, Left Updated:  05/21/20 1345     Blood Culture No growth at 2 days    Blood Culture - Blood, Arm, Right [[id number]] Collected:  05/19/20 1155    Specimen:  Blood from Arm, Right Updated:  05/21/20 1315     Blood Culture No growth at 2 days    Basic Metabolic Panel [[id number]]  (Abnormal) Collected:  05/21/20 0603    Specimen:  Blood Updated:  05/21/20 0717     Glucose 103 mg/dL      BUN 18 mg/dL      Creatinine 1.14 mg/dL      Sodium 138 mmol/L      Potassium 3.9 mmol/L      Chloride 107 mmol/L      CO2 21.0 mmol/L      Calcium 8.8 mg/dL      eGFR Non African Amer 45 mL/min/1.73      BUN/Creatinine Ratio 15.8     Anion Gap 10.0 mmol/L     Narrative:        GFR Normal >60  Chronic Kidney Disease <60  Kidney Failure <15      CBC (No Diff) [[id number]]  (Abnormal) Collected:  05/21/20 0603    Specimen:  Blood Updated:  05/21/20 0705     WBC 5.10 10*3/mm3      RBC 2.75 10*6/mm3      Hemoglobin 8.6 g/dL      Hematocrit 25.3 %      MCV 92.0 fL      MCH 31.3 pg      MCHC 34.0 g/dL      RDW 15.7 %      RDW-SD 53.1 fl      MPV 11.4 fL      Platelets 200 10*3/mm3     CBC (No Diff) [[id number]]  (Abnormal) Collected:  05/20/20 0817    Specimen:  Blood Updated:  05/20/20 0841     WBC 4.63 10*3/mm3      RBC 2.70 10*6/mm3      Hemoglobin 8.3 g/dL      Hematocrit 24.8 %      MCV 91.9 fL      MCH 30.7 pg      MCHC 33.5 g/dL      RDW 15.7 %      RDW-SD 53.1 fl      MPV 11.2 fL      Platelets 190 10*3/mm3     Basic Metabolic Panel [[id number]]  (Abnormal) Collected:  05/20/20 0557    Specimen:  Blood Updated:  05/20/20 0710     Glucose 94 mg/dL      BUN 28 mg/dL      Creatinine 1.20 mg/dL      Sodium 139 mmol/L      Potassium 4.5 mmol/L      Chloride 107 mmol/L      CO2 21.0 mmol/L      Calcium 8.7 mg/dL      eGFR Non African Amer 42 mL/min/1.73      BUN/Creatinine Ratio 23.3     Anion Gap 11.0 mmol/L     Narrative:       GFR Normal >60  Chronic Kidney Disease <60  Kidney Failure <15      Vitamin B12 [[id number]]  (Normal) Collected:  05/19/20 1125    Specimen:  Blood Updated:  05/20/20 0137     Vitamin B-12 283 pg/mL     Narrative:       Results may be falsely increased if patient taking Biotin.      Ferritin [[id number]]  (Abnormal) Collected:  05/19/20 1125    Specimen:  Blood Updated:  05/19/20 1619     Ferritin 179.30 ng/mL     Narrative:       Results may be falsely decreased if patient taking Biotin.      Lactic Acid, Reflex [[id number]]  (Normal) Collected:  05/19/20 1547    Specimen:  Blood Updated:  05/19/20 1615     Lactate 1.4 mmol/L     Iron Profile [[id number]]  (Abnormal) Collected:  05/19/20 1125    Specimen:  Blood Updated:  05/19/20 1614     Iron 61 mcg/dL      Iron  Saturation 22 %      Transferrin 183 mg/dL      TIBC 273 mcg/dL     Lactic Acid, Reflex Timer (This will reflex a repeat order 3-3:15 hours after ordered.) [[id number]] Collected:  05/19/20 1125    Specimen:  Blood Updated:  05/19/20 1530     Hold Tube Hold for add-ons.     Comment: Auto resulted.       Urinalysis, Microscopic Only - Urine, Catheter In/Out [[id number]]  (Abnormal) Collected:  05/19/20 1158    Specimen:  Urine, Catheter In/Out Updated:  05/19/20 1236     RBC, UA 6-12 /HPF      WBC, UA 31-50 /HPF      Bacteria, UA 2+ /HPF      Squamous Epithelial Cells, UA 7-12 /HPF      Hyaline Casts, UA None Seen /LPF      Methodology Automated Microscopy    [place] Draw [[id number]] Collected:  05/19/20 1125    Specimen:  Blood Updated:  05/19/20 1230    Narrative:       The following orders were created for panel order [place] Draw.  Procedure                               Abnormality         Status                     ---------                               -----------         ------                     Light Blue Top[[id number]]                                    Final result               Green Top (Gel)[[id number]]                                   Final result               Lavender Top[[id number]]                                      Final result               Red Top[[id number]]                                           Final result               [place] Blood Culture Bot...[[id number]]                      Final result               Gray Top - Ice[[id number]]                                    Final result                 Please view results for these tests on the individual orders.    Light Blue Top [[id number]] Collected:  05/19/20 1125    Specimen:  Blood Updated:  05/19/20 1230     Extra Tube hold for add-on     Comment: Auto resulted       Green Top (Gel) [[id number]] Collected:  05/19/20 1125    Specimen:  Blood Updated:  05/19/20 1230     Extra Tube Hold for add-ons.     Comment: Auto resulted.       Lavender Top  "[[id number]] Collected:  05/19/20 1125    Specimen:  Blood Updated:  05/19/20 1230     Extra Tube hold for add-on     Comment: Auto resulted       Red Top [[id number]] Collected:  05/19/20 1125    Specimen:  Blood Updated:  05/19/20 1230     Extra Tube Hold for add-ons.     Comment: Auto resulted.       [place] Blood Culture Bottle Set [[id number]] Collected:  05/19/20 1125    Specimen:  Blood from Arm, Left Updated:  05/19/20 1230     Extra Tube Hold for add-ons.     Comment: Auto resulted.       Urinalysis With Culture If Indicated - Urine, Catheter In/Out [[id number]]  (Abnormal) Collected:  05/19/20 1158    Specimen:  Urine, Catheter In/Out Updated:  05/19/20 1219     Color, UA Yellow     Appearance, UA Clear     pH, UA 6.0     Specific Gravity, UA 1.015     Glucose, UA Negative     Ketones, UA Negative     Bilirubin, UA Negative     Blood, UA Moderate (2+)     Protein, UA Negative     Leuk Esterase, UA Large (3+)     Nitrite, UA Negative     Urobilinogen, UA 0.2 E.U./dL    Lactic Acid, Plasma [[id number]]  (Abnormal) Collected:  05/19/20 1125    Specimen:  Blood Updated:  05/19/20 1215     Lactate 2.1 mmol/L     Procalcitonin [[id number]]  (Abnormal) Collected:  05/19/20 1125    Specimen:  Blood Updated:  05/19/20 1204     Procalcitonin 0.07 ng/mL     Narrative:       As a Marker for Sepsis (Non-Neonates):   1. <0.5 ng/mL represents a low risk of severe sepsis and/or septic shock.  1. >2 ng/mL represents a high risk of severe sepsis and/or septic shock.    As a Marker for Lower Respiratory Tract Infections that require antibiotic therapy:  PCT on Admission     Antibiotic Therapy             6-12 Hrs later  > 0.5                Strongly Recommended            >0.25 - <0.5         Recommended  0.1 - 0.25           Discouraged                   Remeasure/reassess PCT  <0.1                 Strongly Discouraged          Remeasure/reassess PCT      As 28 day mortality risk marker: \"Change in Procalcitonin Result\" (> 80 % or " <=80 %) if Day 0 (or Day 1) and Day 4 values are available. Refer to http://www.[facility]-pct-calculator.com/   Change in PCT <=80 %   A decrease of PCT levels below or equal to 80 % defines a positive change in PCT test result representing a higher risk for 28-day all-cause mortality of patients diagnosed with severe sepsis or septic shock.  Change in PCT > 80 %   A decrease of PCT levels of more than 80 % defines a negative change in PCT result representing a lower risk for 28-day all-cause mortality of patients diagnosed with severe sepsis or septic shock.                Results may be falsely decreased if patient taking Biotin.     Comprehensive Metabolic Panel [[id number]]  (Abnormal) Collected:  05/19/20 1125    Specimen:  Blood Updated:  05/19/20 1201     Glucose 118 mg/dL      BUN 38 mg/dL      Creatinine 1.62 mg/dL      Sodium 135 mmol/L      Potassium 4.0 mmol/L      Chloride 100 mmol/L      CO2 21.0 mmol/L      Calcium 9.0 mg/dL      Total Protein 7.1 g/dL      Albumin 3.80 g/dL      ALT (SGPT) 7 U/L      AST (SGOT) 15 U/L      Alkaline Phosphatase 72 U/L      Total Bilirubin 0.3 mg/dL      eGFR Non African Amer 30 mL/min/1.73      Globulin 3.3 gm/dL      A/G Ratio 1.2 g/dL      BUN/Creatinine Ratio 23.5     Anion Gap 14.0 mmol/L     Narrative:       GFR Normal >60  Chronic Kidney Disease <60  Kidney Failure <15      Gray Top - Ice [[id number]] Collected:  05/19/20 1125    Specimen:  Blood Updated:  05/19/20 1158     Extra Tube --    Lipase [[id number]]  (Normal) Collected:  05/19/20 1125    Specimen:  Blood Updated:  05/19/20 1156     Lipase 53 U/L     Troponin [[id number]]  (Normal) Collected:  05/19/20 1125    Specimen:  Blood Updated:  05/19/20 1156     Troponin T <0.010 ng/mL     Narrative:       Troponin T Reference Range:  <= 0.03 ng/mL-   Negative for AMI  >0.03 ng/mL-     Abnormal for myocardial necrosis.  Clinicians would have to utilize clinical acumen, EKG, Troponin and serial changes to determine if it  is an Acute Myocardial Infarction or myocardial injury due to an underlying chronic condition.       Results may be falsely decreased if patient taking Biotin.      CBC & Differential [[id number]] Collected:  05/19/20 1125    Specimen:  Blood Updated:  05/19/20 1140    Narrative:       The following orders were created for panel order CBC & Differential.  Procedure                               Abnormality         Status                     ---------                               -----------         ------                     CBC Auto Differential[[id number]]        Abnormal            Final result                 Please view results for these tests on the individual orders.    CBC Auto Differential [[id number]]  (Abnormal) Collected:  05/19/20 1125    Specimen:  Blood Updated:  05/19/20 1140     WBC 5.84 10*3/mm3      RBC 2.95 10*6/mm3      Hemoglobin 9.1 g/dL      Hematocrit 27.5 %      MCV 93.2 fL      MCH 30.8 pg      MCHC 33.1 g/dL      RDW 15.7 %      RDW-SD 54.0 fl      MPV 11.0 fL      Platelets 202 10*3/mm3      Neutrophil % 66.5 %      Lymphocyte % 21.6 %      Monocyte % 8.4 %      Eosinophil % 2.9 %      Basophil % 0.3 %      Immature Grans % 0.3 %      Neutrophils, Absolute 3.88 10*3/mm3      Lymphocytes, Absolute 1.26 10*3/mm3      Monocytes, Absolute 0.49 10*3/mm3      Eosinophils, Absolute 0.17 10*3/mm3      Basophils, Absolute 0.02 10*3/mm3      Immature Grans, Absolute 0.02 10*3/mm3      nRBC 0.0 /100 WBC         Culture Data:   Blood Culture   Date Value Ref Range Status   05/19/2020 No growth at 2 days  Preliminary   05/19/2020 No growth at 2 days  Preliminary     Urine Culture   Date Value Ref Range Status   05/19/2020 <25,000 CFU/mL Streptococcus agalactiae (Group B) (A)  Final     Comment:       This organism is considered to be universally susceptible to penicillin.  No further antibiotic testing will be performed.     Imaging Results (All)     Procedure Component Value Units Date/Time    CT  Abdomen Pelvis Without Contrast [[id number]] Collected:  05/19/20 1216     Updated:  05/19/20 1227    Narrative:       CT ABDOMEN PELVIS WO CONTRAST- 5/19/2020 12:07 PM CDT     HISTORY: Abd pain, fever, abscess suspected      COMPARISON: None      DOSE LENGTH PRODUCT: 230 mGy cm. Automated exposure control was also  utilized to decrease patient radiation dose.     TECHNIQUE: Noncontrast enhanced images of the abdomen and pelvis  obtained without oral contrast. Multiplanar reformatted images were  provided for review.      FINDINGS:   LOWER CHEST: Mild scarring in both bases. No consolidation.      LIVER: No focal liver lesion within limits of a noncontrast study.      BILIARY SYSTEM: The gallbladder has been removed. There is no evidence  of biliary ductal dilation.      PANCREAS: Diffuse atrophy. No obvious focal lesion.      SPLEEN: Normal size.      KIDNEYS: Bilateral kidneys are grossly unremarkable. No hydronephrosis.  The ureters are decompressed and normal in appearance.     ADRENALS: Unremarkable.     RETROPERITONEUM: No mass, lymphadenopathy or hemorrhage.      GI TRACT: Stomach is unremarkable. The small bowel is nondilated. There  appears to be prior appendectomy. The colon is mostly decompressed.  Prominent rectal stool with mild distention. Notable circumferential  wall thickening at the rectum with adjacent inflammatory stranding in  the mesorectal fat. No extraluminal collection/abscess identified.  Underlying sigmoid diverticulosis.     OTHER: No intraperitoneal free fluid or free air. No mesenteric  adenopathy. Rectus diastases. Age indeterminant wedge compression  deformities at L1 and L2.      PELVIS: No mass lesion, fluid collection or significant lymphadenopathy  is seen in the pelvis. The urinary bladder is normal in appearance.       Impression:       1. Proctitis vs stercoral colitis. Proctitis is favored, as the rectal  is only slightly distended. Associated inflammatory stranding in  the  mesorectal fat. No visualized perforation or evidence of bowel  obstruction.  This report was finalized on 05/19/2020 12:24 by Dr Constantin Sanchez, .        Hospital Course  Patient is a 89 y.o. female presented to [facility] emergency room [facility]/19/2020 from [facility] after an episode of weakness and near syncopal at the skilled facility.  Patient had been reporting some nausea, vomiting and generalized abdominal pain.  Urinalysis 31-50 WBC, 2+ bacteria, WBC 5.84, hemoglobin 9.1, hematocrit 27.5, creatinine 1.62, sodium 135. CT scan of the abdomen reported proctitis versus stercoral colitis.  Proctitis favored as the rectal is only slightly distended.  Inflammatory stranding in mesorectal fat.  No evidence of bowel obstruction.  She received lactated Ringer's fluid bolus and Invanz in the emergency room.    She was admitted to the medical floor with near syncopal episode, weakness nausea vomiting abdominal pain, concern for urinary tract infection and proctitis.  Invanz continued.  Patient did not appear septic.  Lactic acid 2.1.  Patient was not tachycardic and blood pressure normal.  No leukocytosis noted.  Creatinine 0.9 February 2020.  Creatinine 1.6 on admission.  Hydrochlorothiazide and lisinopril held.  SCDs placed for deep vein thrombosis prophylaxis.    Acute kidney injury with creatinine 1.62 on admission.  She was hydrated with IV fluids and creatinine returned to baseline 1.12.  Creatinine was noted be 0.9 February 2020.  Lisinopril resumed when creatinine returned to normal as blood pressure trended upward.  Hydrochlorothiazide not resumed during hospital stay or discharge.    Urine culture positive for Streptococcus agalactiae.  She received Invanz during hospital stay and was transitioned to Macrobid 100 mg twice daily for an additional 5 days beginning 5/22/2020.    CT scan of the abdomen noted proctitis versus stercoral colitis.  Proctitis favored.  Patient denied  "any abdominal pain.  She had a bowel movement on admission.  Invanz continued as it would cover both UTI and possible colitis.    History of hypertension.  Blood pressure remained elevated.  Lisinopril resumed.  Hydrochlorothiazide not continued on admission or at discharge.  Norvasc started and uptitrated to 10 mg daily with better blood pressure control.  Blood pressure 157/60 at discharge.    Chronic normocytic anemia remained stable with hemoglobin 8.6/hematocrit 25.3.  Iron 61, saturation 22%.  TIBC 273.    Physical therapy and Occupational Therapy consulted.    Patient with confusion at baseline.    On 5/22/2020 she is stable for discharge back to [facility].  She will complete Macrobid 100 mg twice daily for 5 days.  Norvasc added for blood pressure control.  Hydrochlorothiazide discontinued.  She is followed by Dr. Finch.    Physical Exam on Discharge:  /60 (BP Location: Right arm, Patient Position: Lying)   Pulse 63   Temp 98.3 °F (36.8 °C) (Oral)   Resp 16   Ht 157.5 cm (62\")   Wt 67.1 kg (148 lb)   SpO2 96%   BMI 27.07 kg/m²    Physical Exam   Constitutional:   Lying in bed.  Staff eating breakfast.  No oxygen in use.  No complaints voiced.   HENT:   Head: Normocephalic and atraumatic.   Eyes: Pupils are equal, round, and reactive to light. EOM are normal.   Blind   Neck: Normal range of motion. Neck supple.   Cardiovascular: Normal rate, regular rhythm, normal heart sounds and intact distal pulses. Exam reveals no gallop and no friction rub.   No murmur heard.  Pulmonary/Chest: She has no wheezes. She has no rales.   No oxygen in use.   Abdominal: Soft. Bowel sounds are normal. She exhibits no distension. There is no tenderness.   Genitourinary:   Genitourinary Comments: Brief in place.   Musculoskeletal: Normal range of motion. She exhibits no edema, tenderness or deformity.   SCDs bilateral lower extremities   Neurological:   Oriented to person only.  Follows commands.  " Does not answer questions appropriately.   Skin: No rash noted. No erythema. No pallor.   Psychiatric:   Impaired thought     Condition on Discharge: Stable    [facility]    Discharge Diet:   Diet Instructions     Advance Diet As Tolerated        As tolerated    Activity at Discharge:   Activity Instructions     Activity as Tolerated        As tolerated    Discharge Care Plan / Instructions:   1.  Norvasc 10 mg orally daily  2.  Discontinue hydrochlorothiazide  3.  Macrobid 100 mg twice daily for 5 days.  Begin 5/22/2020.  4.  For returning or worsening weakness with near syncope seek medical attention.    Discharge Medications:     Discharge Medications      New Medications      Instructions Start Date   amLODIPine 10 MG tablet  Commonly known as:  NORVASC   10 mg, Oral, Every 24 Hours Scheduled   Start Date:  May 23, 2020     ferrous sulfate 325 (65 FE) MG tablet   325 mg, Oral, Daily With Breakfast   Start Date:  May 23, 2020     nitrofurantoin (macrocrystal-monohydrate) 100 MG capsule  Commonly known as:  MACROBID   100 mg, Oral, Every 12 Hours Scheduled.  For 5 days begin 5/22/2020.         Continue These Medications      Instructions Start Date   atorvastatin 10 MG tablet  Commonly known as:  LIPITOR   10 mg, Oral, Daily      esomeprazole 20 MG capsule  Commonly known as:  nexIUM   20 mg, Oral, Every Morning Before Breakfast      lisinopril 20 MG tablet  Commonly known as:  PRINIVIL,ZESTRIL   20 mg, Oral, Daily      metoprolol succinate  MG 24 hr tablet  Commonly known as:  TOPROL-XL   100 mg, Oral, Daily      Mirabegron ER 50 MG tablet sustained-release 24 hour 24 hr tablet  Commonly known as:  MYRBETRIQ   50 mg, Oral, Daily      QUEtiapine 25 MG tablet  Commonly known as:  SEROquel   25 mg, Oral, 3 Times Daily      UTI-Stat liquid   30 mL, Oral, 2 Times Daily         Stop These Medications    hydroCHLOROthiazide 12.5 MG capsule  Commonly known as:  MICROZIDE           Follow-up Appointments:   Follow-up Information     Nunu Finch MD Follow up.    Specialty:  Family Medicine  Why:  Follows at [facility]  Contact information:  [address] [place] [id number] [phone]                 Future Appointments:  No future appointments.    Test Results Pending at Discharge: None    The above documentation resulted from a face-to-face encounter by me Sandi ROSALES, [facility].    CONNIE Neal  05/22/20  09:53    Time: This discharge process required 35 minutes for completion.    Plan discussed with Dr. Tierney    Time spent in face-to-face evaluation, chart review, planning and education 35 minutes.          Electronically signed by Sandi Arreola APRN at 05/22/20 1011

## 2020-05-24 LAB
BACTERIA SPEC AEROBE CULT: NORMAL
BACTERIA SPEC AEROBE CULT: NORMAL

## 2020-06-01 ENCOUNTER — HOSPITAL ENCOUNTER (OUTPATIENT)
Dept: NON INVASIVE DIAGNOSTICS | Age: 85
Discharge: HOME | End: 2020-06-01
Payer: MEDICARE

## 2020-06-01 PROCEDURE — 93229 REMOTE 30 DAY ECG TECH SUPP: CPT

## 2020-06-27 PROCEDURE — 0298T PR EXT ECG > 48HR TO 21 DAY REVIEW AND INTERPRETATN: CPT | Performed by: INTERNAL MEDICINE

## 2020-06-27 NOTE — PROCEDURES
[facility] Report      Feli Police    : 7/3/1929      Test Date:  2020    Analysis Date:  6/15/2020    Date Interpreted: 2020        1. Nearly 13 days and 20 hours of rhythm are processed after artifact was removed     2. The underlying rhythm is normal sinus rhythm at a mean heart rate of 61 bpm, with a range of 46 to 91 bpm.    3.  The were frequent runs of atrial fibrillation, mean rate of 61, range 46 to 174. There was frequent runs of aberrancy, the longest of which was 7 beats at a rate of 174 bpm which spontaneously terminated. 4.  The were rare extra ventricular beats. The majority of these were single isolated PVC's, there was no VT. 5.  Prolonged pauses or high degree AV block were not noted. 6.  Triggered Events or Diary Enteries was associated with normal sinus rhythm and PACs      Impression: This ZIO Patch shows frequent atrial fibrillation occasionally with aberrancy. There was no significant  ventricular ectopy and is without prolonged pauses.       Electronically signed by Letitia Kent MD on 20

## 2020-08-18 ENCOUNTER — APPOINTMENT (OUTPATIENT)
Dept: CT IMAGING | Facility: HOSPITAL | Age: 85
End: 2020-08-18

## 2020-08-18 ENCOUNTER — APPOINTMENT (OUTPATIENT)
Dept: GENERAL RADIOLOGY | Facility: HOSPITAL | Age: 85
End: 2020-08-18

## 2020-08-18 ENCOUNTER — HOSPITAL ENCOUNTER (EMERGENCY)
Facility: HOSPITAL | Age: 85
Discharge: NURSING, REHAB OR LONG-TERM CARE | End: 2020-08-18
Admitting: EMERGENCY MEDICINE

## 2020-08-18 ENCOUNTER — TELEPHONE (OUTPATIENT)
Dept: PRIMARY CARE CLINIC | Age: 85
End: 2020-08-18

## 2020-08-18 VITALS
RESPIRATION RATE: 16 BRPM | WEIGHT: 148 LBS | TEMPERATURE: 98.4 F | HEIGHT: 62 IN | SYSTOLIC BLOOD PRESSURE: 148 MMHG | OXYGEN SATURATION: 95 % | HEART RATE: 62 BPM | BODY MASS INDEX: 27.23 KG/M2 | DIASTOLIC BLOOD PRESSURE: 62 MMHG

## 2020-08-18 DIAGNOSIS — R07.89 CHEST WALL PAIN: ICD-10-CM
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
## 2020-08-18 LAB
ALBUMIN SERPL-MCNC: 4 G/DL (ref 3.5–5.2)
ALBUMIN/GLOB SERPL: 1.3 G/DL
ALP SERPL-CCNC: 73 U/L (ref 39–117)
ALT SERPL W P-5'-P-CCNC: 9 U/L (ref 1–33)
ANION GAP SERPL CALCULATED.3IONS-SCNC: 14 MMOL/L (ref 5–15)
AST SERPL-CCNC: 15 U/L (ref 1–32)
BASOPHILS # BLD AUTO: 0.02 10*3/MM3 (ref 0–0.2)
BASOPHILS NFR BLD AUTO: 0.3 % (ref 0–1.5)
BILIRUB SERPL-MCNC: 0.2 MG/DL (ref 0–1.2)
BUN SERPL-MCNC: 22 MG/DL (ref 8–23)
BUN/CREAT SERPL: 18 (ref 7–25)
CALCIUM SPEC-SCNC: 8.8 MG/DL (ref 8.2–9.6)
CHLORIDE SERPL-SCNC: 108 MMOL/L (ref 98–107)
CO2 SERPL-SCNC: 21 MMOL/L (ref 22–29)
CREAT SERPL-MCNC: 1.22 MG/DL (ref 0.57–1)
DEPRECATED RDW RBC AUTO: 47.4 FL (ref 37–54)
EOSINOPHIL # BLD AUTO: 0.1 10*3/MM3 (ref 0–0.4)
EOSINOPHIL NFR BLD AUTO: 1.3 % (ref 0.3–6.2)
ERYTHROCYTE [DISTWIDTH] IN BLOOD BY AUTOMATED COUNT: 13.1 % (ref 12.3–15.4)
GFR SERPL CREATININE-BSD FRML MDRD: 41 ML/MIN/1.73
GLOBULIN UR ELPH-MCNC: 3 GM/DL
GLUCOSE SERPL-MCNC: 127 MG/DL (ref 65–99)
HCT VFR BLD AUTO: 30.8 % (ref 34–46.6)
HGB BLD-MCNC: 10 G/DL (ref 12–15.9)
IMM GRANULOCYTES # BLD AUTO: 0.03 10*3/MM3 (ref 0–0.05)
IMM GRANULOCYTES NFR BLD AUTO: 0.4 % (ref 0–0.5)
LYMPHOCYTES # BLD AUTO: 0.86 10*3/MM3 (ref 0.7–3.1)
LYMPHOCYTES NFR BLD AUTO: 11.4 % (ref 19.6–45.3)
MCH RBC QN AUTO: 32.2 PG (ref 26.6–33)
MCHC RBC AUTO-ENTMCNC: 32.5 G/DL (ref 31.5–35.7)
MCV RBC AUTO: 99 FL (ref 79–97)
MONOCYTES # BLD AUTO: 0.55 10*3/MM3 (ref 0.1–0.9)
MONOCYTES NFR BLD AUTO: 7.3 % (ref 5–12)
NEUTROPHILS NFR BLD AUTO: 5.99 10*3/MM3 (ref 1.7–7)
NEUTROPHILS NFR BLD AUTO: 79.3 % (ref 42.7–76)
NRBC BLD AUTO-RTO: 0 /100 WBC (ref 0–0.2)
PLATELET # BLD AUTO: 189 10*3/MM3 (ref 140–450)
PMV BLD AUTO: 10.1 FL (ref 6–12)
POTASSIUM SERPL-SCNC: 4.2 MMOL/L (ref 3.5–5.2)
PROT SERPL-MCNC: 7 G/DL (ref 6–8.5)
RBC # BLD AUTO: 3.11 10*6/MM3 (ref 3.77–5.28)
SODIUM SERPL-SCNC: 143 MMOL/L (ref 136–145)
TROPONIN T SERPL-MCNC: <0.01 NG/ML (ref 0–0.03)
WBC # BLD AUTO: 7.55 10*3/MM3 (ref 3.4–10.8)

## 2020-08-18 PROCEDURE — 93010 ELECTROCARDIOGRAM REPORT: CPT | Performed by: INTERNAL MEDICINE

## 2020-08-18 PROCEDURE — 72125 CT NECK SPINE W/O DYE: CPT

## 2020-08-18 PROCEDURE — 99284 EMERGENCY DEPT VISIT MOD MDM: CPT

## 2020-08-18 PROCEDURE — 93005 ELECTROCARDIOGRAM TRACING: CPT | Performed by: NURSE PRACTITIONER

## 2020-08-18 PROCEDURE — 72110 X-RAY EXAM L-2 SPINE 4/>VWS: CPT

## 2020-08-18 PROCEDURE — 70450 CT HEAD/BRAIN W/O DYE: CPT

## 2020-08-18 PROCEDURE — 80053 COMPREHEN METABOLIC PANEL: CPT | Performed by: NURSE PRACTITIONER

## 2020-08-18 PROCEDURE — 84484 ASSAY OF TROPONIN QUANT: CPT | Performed by: NURSE PRACTITIONER

## 2020-08-18 PROCEDURE — 73523 X-RAY EXAM HIPS BI 5/> VIEWS: CPT

## 2020-08-18 PROCEDURE — 85025 COMPLETE CBC W/AUTO DIFF WBC: CPT | Performed by: NURSE PRACTITIONER

## 2020-08-18 PROCEDURE — 71045 X-RAY EXAM CHEST 1 VIEW: CPT

## 2020-08-18 NOTE — TELEPHONE ENCOUNTER
Lizzie Price called from the [facility] states patient fell yesterday and again today, today she fell on her bottom and hit her head. Lizzie Shirts did not see or feel any knot, no broken skin. No LOC. Pt did a while after start complaining of chest pain, hurting to breath and or talk the daughter was very concerned. Her Bp was 104/62 P82 o2 97.  I called and spoke with Dr Kirt Melvin she wants her sent to the ER for sam morales was notified and voiced her understanding

## 2020-08-19 NOTE — ED PROVIDER NOTES
"Subjective   Patient is a 89 yo female presents via EMS from [facility] with complaints of chest pain and back pain secondary to fall. Patient tells me that she \"isn't suppose to be walking but I did it anyway.\" She states that she was walking to the bathroom unassisted and fell. She believes it was a mechanical fall. She isn't certain if she hit her head. She has had complaints of low back pain and chest wall pain secondary to the fall. When asking more details of the fall she states \"I don't really know. I'm 89 years old. But I won't get up again on you so you don't have to worry.\"        Fall   Mechanism of injury: fall    Injury location:  Torso  Torso injury location:  Back, R chest and L chest  Incident location:  Nursing home  Arrived directly from scene: yes    Fall:     Fall occurred:  Walking  Prior to arrival data:     Patient ambulatory at scene: unknown.    Associated symptoms: back pain, chest pain and headaches    Associated symptoms: no abdominal pain, no neck pain, no seizures and no vomiting    Risk factors comment:  Elderly      Review of Systems   Cardiovascular: Positive for chest pain.   Gastrointestinal: Negative for abdominal pain, constipation, diarrhea and vomiting.   Musculoskeletal: Positive for back pain. Negative for neck pain.   Skin: Negative.  Negative for wound.   Neurological: Positive for headaches. Negative for seizures.   All other systems reviewed and are negative.      Past Medical History:   Diagnosis Date   • Arthritis    • Hyperlipidemia    • Hypertension        Allergies   Allergen Reactions   • Ampicillin Unknown - High Severity       Past Surgical History:   Procedure Laterality Date   • BREAST BIOPSY Right 1970   • HYSTERECTOMY         Family History   Problem Relation Age of Onset   • Breast cancer Maternal Aunt        Social History     Socioeconomic History   • Marital status:      Spouse name: Not on file   • Number of children: " Not on file   • Years of education: Not on file   • Highest education level: Not on file   Tobacco Use   • Smoking status: Never Smoker   • Smokeless tobacco: Never Used           Objective   Physical Exam   Constitutional: She is oriented to person, place, and time. She appears well-developed and well-nourished.   HENT:   Head: Normocephalic.   Right Ear: External ear normal.   Left Ear: External ear normal.   Nose: Nose normal.   Mouth/Throat: Oropharynx is clear and moist.   Eyes: Pupils are equal, round, and reactive to light. Conjunctivae and EOM are normal.   Neck: Normal range of motion. Neck supple.   Cardiovascular: Normal rate and regular rhythm.   Pulmonary/Chest: Effort normal and breath sounds normal. She exhibits tenderness.   Abdominal: Soft. Bowel sounds are normal. She exhibits no distension and no mass. There is no tenderness. There is no guarding.   Musculoskeletal: Normal range of motion.   Pain to palpation to the lumbar spine without vertebral point tenderness or step off noted; neurovascular intact   Neurological: She is alert and oriented to person, place, and time.   Skin: Skin is warm. Capillary refill takes less than 2 seconds.   Psychiatric: She has a normal mood and affect. Her behavior is normal. Judgment and thought content normal.   Nursing note and vitals reviewed.      Procedures           ED Course                                           MDM  Number of Diagnoses or Management Options  Chest wall pain: new and requires workup  Fall, initial encounter: new and requires workup     Amount and/or Complexity of Data Reviewed  Clinical lab tests: ordered and reviewed  Tests in the radiology section of CPT®: ordered and reviewed  Decide to obtain previous medical records or to obtain history from someone other than the patient: yes  Discuss the patient with other providers: yes    Risk of Complications, Morbidity, and/or Mortality  Presenting problems: moderate  Diagnostic procedures:  moderate  Management options: moderate    Patient Progress  Patient progress: improved      Final diagnoses:   Fall, initial encounter   Chest wall pain            Breana Feldman, APRN  08/19/20 0005

## 2021-03-26 ENCOUNTER — HOSPITAL ENCOUNTER (EMERGENCY)
Facility: HOSPITAL | Age: 86
Discharge: NURSING, REHAB OR LONG-TERM CARE | End: 2021-03-26
Attending: EMERGENCY MEDICINE | Admitting: EMERGENCY MEDICINE

## 2021-03-26 ENCOUNTER — APPOINTMENT (OUTPATIENT)
Dept: CT IMAGING | Facility: HOSPITAL | Age: 86
End: 2021-03-26

## 2021-03-26 ENCOUNTER — APPOINTMENT (OUTPATIENT)
Dept: GENERAL RADIOLOGY | Facility: HOSPITAL | Age: 86
End: 2021-03-26

## 2021-03-26 VITALS
HEART RATE: 88 BPM | OXYGEN SATURATION: 98 % | DIASTOLIC BLOOD PRESSURE: 85 MMHG | WEIGHT: 129 LBS | BODY MASS INDEX: 24.35 KG/M2 | SYSTOLIC BLOOD PRESSURE: 165 MMHG | HEIGHT: 61 IN | RESPIRATION RATE: 18 BRPM | TEMPERATURE: 98 F

## 2021-03-26 DIAGNOSIS — N39.0 URINARY TRACT INFECTION WITHOUT HEMATURIA, SITE UNSPECIFIED: ICD-10-CM

## 2021-03-26 DIAGNOSIS — R55 SYNCOPE, UNSPECIFIED SYNCOPE TYPE: Primary | ICD-10-CM

## 2021-03-26 LAB
ALBUMIN SERPL-MCNC: 3.5 G/DL (ref 3.5–5.2)
ALBUMIN/GLOB SERPL: 1 G/DL
ALP SERPL-CCNC: 75 U/L (ref 39–117)
ALT SERPL W P-5'-P-CCNC: 7 U/L (ref 1–33)
ANION GAP SERPL CALCULATED.3IONS-SCNC: 12 MMOL/L (ref 5–15)
APTT PPP: 20.4 SECONDS (ref 24.1–35)
AST SERPL-CCNC: 16 U/L (ref 1–32)
BACTERIA UR QL AUTO: ABNORMAL /HPF
BASOPHILS # BLD AUTO: 0.02 10*3/MM3 (ref 0–0.2)
BASOPHILS NFR BLD AUTO: 0.3 % (ref 0–1.5)
BILIRUB SERPL-MCNC: 0.2 MG/DL (ref 0–1.2)
BILIRUB UR QL STRIP: NEGATIVE
BUN SERPL-MCNC: 24 MG/DL (ref 8–23)
BUN/CREAT SERPL: 17.1 (ref 7–25)
CALCIUM SPEC-SCNC: 8.9 MG/DL (ref 8.2–9.6)
CHLORIDE SERPL-SCNC: 104 MMOL/L (ref 98–107)
CLARITY UR: CLEAR
CO2 SERPL-SCNC: 22 MMOL/L (ref 22–29)
COLOR UR: ABNORMAL
CREAT SERPL-MCNC: 1.4 MG/DL (ref 0.57–1)
D DIMER PPP FEU-MCNC: 1.76 MG/L (FEU) (ref 0–0.5)
D-LACTATE SERPL-SCNC: 2.1 MMOL/L (ref 0.5–2)
DEPRECATED RDW RBC AUTO: 46 FL (ref 37–54)
EOSINOPHIL # BLD AUTO: 0.1 10*3/MM3 (ref 0–0.4)
EOSINOPHIL NFR BLD AUTO: 1.5 % (ref 0.3–6.2)
ERYTHROCYTE [DISTWIDTH] IN BLOOD BY AUTOMATED COUNT: 12.9 % (ref 12.3–15.4)
GFR SERPL CREATININE-BSD FRML MDRD: 35 ML/MIN/1.73
GLOBULIN UR ELPH-MCNC: 3.5 GM/DL
GLUCOSE SERPL-MCNC: 105 MG/DL (ref 65–99)
GLUCOSE UR STRIP-MCNC: NEGATIVE MG/DL
HCT VFR BLD AUTO: 29 % (ref 34–46.6)
HGB BLD-MCNC: 9.6 G/DL (ref 12–15.9)
HGB UR QL STRIP.AUTO: NEGATIVE
HYALINE CASTS UR QL AUTO: ABNORMAL /LPF
IMM GRANULOCYTES # BLD AUTO: 0.01 10*3/MM3 (ref 0–0.05)
IMM GRANULOCYTES NFR BLD AUTO: 0.2 % (ref 0–0.5)
INR PPP: 1.01 (ref 0.91–1.09)
KETONES UR QL STRIP: ABNORMAL
LEUKOCYTE ESTERASE UR QL STRIP.AUTO: ABNORMAL
LIPASE SERPL-CCNC: 47 U/L (ref 13–60)
LYMPHOCYTES # BLD AUTO: 0.9 10*3/MM3 (ref 0.7–3.1)
LYMPHOCYTES NFR BLD AUTO: 13.6 % (ref 19.6–45.3)
MCH RBC QN AUTO: 32.3 PG (ref 26.6–33)
MCHC RBC AUTO-ENTMCNC: 33.1 G/DL (ref 31.5–35.7)
MCV RBC AUTO: 97.6 FL (ref 79–97)
MONOCYTES # BLD AUTO: 0.49 10*3/MM3 (ref 0.1–0.9)
MONOCYTES NFR BLD AUTO: 7.4 % (ref 5–12)
NEUTROPHILS NFR BLD AUTO: 5.08 10*3/MM3 (ref 1.7–7)
NEUTROPHILS NFR BLD AUTO: 77 % (ref 42.7–76)
NITRITE UR QL STRIP: NEGATIVE
NRBC BLD AUTO-RTO: 0 /100 WBC (ref 0–0.2)
NT-PROBNP SERPL-MCNC: 772 PG/ML (ref 0–1800)
PH UR STRIP.AUTO: 6 [PH] (ref 5–8)
PLATELET # BLD AUTO: 186 10*3/MM3 (ref 140–450)
PMV BLD AUTO: 10.6 FL (ref 6–12)
POTASSIUM SERPL-SCNC: 4.2 MMOL/L (ref 3.5–5.2)
PROT SERPL-MCNC: 7 G/DL (ref 6–8.5)
PROT UR QL STRIP: ABNORMAL
PROTHROMBIN TIME: 12.9 SECONDS (ref 11.9–14.6)
RBC # BLD AUTO: 2.97 10*6/MM3 (ref 3.77–5.28)
RBC # UR: ABNORMAL /HPF
REF LAB TEST METHOD: ABNORMAL
SARS-COV-2 RNA PNL SPEC NAA+PROBE: NOT DETECTED
SODIUM SERPL-SCNC: 138 MMOL/L (ref 136–145)
SP GR UR STRIP: 1.02 (ref 1–1.03)
SQUAMOUS #/AREA URNS HPF: ABNORMAL /HPF
TROPONIN T SERPL-MCNC: <0.01 NG/ML (ref 0–0.03)
TROPONIN T SERPL-MCNC: <0.01 NG/ML (ref 0–0.03)
UROBILINOGEN UR QL STRIP: ABNORMAL
WBC # BLD AUTO: 6.6 10*3/MM3 (ref 3.4–10.8)
WBC UR QL AUTO: ABNORMAL /HPF

## 2021-03-26 PROCEDURE — 83880 ASSAY OF NATRIURETIC PEPTIDE: CPT | Performed by: EMERGENCY MEDICINE

## 2021-03-26 PROCEDURE — 84484 ASSAY OF TROPONIN QUANT: CPT | Performed by: EMERGENCY MEDICINE

## 2021-03-26 PROCEDURE — 71045 X-RAY EXAM CHEST 1 VIEW: CPT

## 2021-03-26 PROCEDURE — 96365 THER/PROPH/DIAG IV INF INIT: CPT

## 2021-03-26 PROCEDURE — 85025 COMPLETE CBC W/AUTO DIFF WBC: CPT | Performed by: EMERGENCY MEDICINE

## 2021-03-26 PROCEDURE — 83605 ASSAY OF LACTIC ACID: CPT | Performed by: EMERGENCY MEDICINE

## 2021-03-26 PROCEDURE — 85379 FIBRIN DEGRADATION QUANT: CPT | Performed by: EMERGENCY MEDICINE

## 2021-03-26 PROCEDURE — 87635 SARS-COV-2 COVID-19 AMP PRB: CPT | Performed by: EMERGENCY MEDICINE

## 2021-03-26 PROCEDURE — 99284 EMERGENCY DEPT VISIT MOD MDM: CPT

## 2021-03-26 PROCEDURE — 96375 TX/PRO/DX INJ NEW DRUG ADDON: CPT

## 2021-03-26 PROCEDURE — 36415 COLL VENOUS BLD VENIPUNCTURE: CPT

## 2021-03-26 PROCEDURE — 93010 ELECTROCARDIOGRAM REPORT: CPT | Performed by: INTERNAL MEDICINE

## 2021-03-26 PROCEDURE — 70450 CT HEAD/BRAIN W/O DYE: CPT

## 2021-03-26 PROCEDURE — 0 IOPAMIDOL PER 1 ML: Performed by: EMERGENCY MEDICINE

## 2021-03-26 PROCEDURE — 87086 URINE CULTURE/COLONY COUNT: CPT | Performed by: EMERGENCY MEDICINE

## 2021-03-26 PROCEDURE — P9612 CATHETERIZE FOR URINE SPEC: HCPCS

## 2021-03-26 PROCEDURE — 25010000002 CEFTRIAXONE PER 250 MG: Performed by: EMERGENCY MEDICINE

## 2021-03-26 PROCEDURE — 93005 ELECTROCARDIOGRAM TRACING: CPT | Performed by: EMERGENCY MEDICINE

## 2021-03-26 PROCEDURE — 81001 URINALYSIS AUTO W/SCOPE: CPT | Performed by: EMERGENCY MEDICINE

## 2021-03-26 PROCEDURE — 85610 PROTHROMBIN TIME: CPT | Performed by: EMERGENCY MEDICINE

## 2021-03-26 PROCEDURE — 80053 COMPREHEN METABOLIC PANEL: CPT | Performed by: EMERGENCY MEDICINE

## 2021-03-26 PROCEDURE — 83690 ASSAY OF LIPASE: CPT | Performed by: EMERGENCY MEDICINE

## 2021-03-26 PROCEDURE — 85730 THROMBOPLASTIN TIME PARTIAL: CPT | Performed by: EMERGENCY MEDICINE

## 2021-03-26 PROCEDURE — 71275 CT ANGIOGRAPHY CHEST: CPT

## 2021-03-26 PROCEDURE — 25010000002 ONDANSETRON PER 1 MG: Performed by: EMERGENCY MEDICINE

## 2021-03-26 PROCEDURE — 74177 CT ABD & PELVIS W/CONTRAST: CPT

## 2021-03-26 RX ORDER — ONDANSETRON 2 MG/ML
4 INJECTION INTRAMUSCULAR; INTRAVENOUS ONCE
Status: DISCONTINUED | OUTPATIENT
Start: 2021-03-26 | End: 2021-03-26

## 2021-03-26 RX ORDER — CEFDINIR 300 MG/1
300 CAPSULE ORAL 2 TIMES DAILY
Qty: 14 CAPSULE | Refills: 0 | Status: SHIPPED | OUTPATIENT
Start: 2021-03-26 | End: 2021-04-02

## 2021-03-26 RX ORDER — ONDANSETRON 2 MG/ML
4 INJECTION INTRAMUSCULAR; INTRAVENOUS ONCE
Status: COMPLETED | OUTPATIENT
Start: 2021-03-26 | End: 2021-03-26

## 2021-03-26 RX ADMIN — CEFTRIAXONE SODIUM 1 G: 1 INJECTION, POWDER, FOR SOLUTION INTRAMUSCULAR; INTRAVENOUS at 13:39

## 2021-03-26 RX ADMIN — SODIUM CHLORIDE, POTASSIUM CHLORIDE, SODIUM LACTATE AND CALCIUM CHLORIDE 1000 ML: 600; 310; 30; 20 INJECTION, SOLUTION INTRAVENOUS at 13:39

## 2021-03-26 RX ADMIN — IOPAMIDOL 100 ML: 755 INJECTION, SOLUTION INTRAVENOUS at 13:39

## 2021-03-26 RX ADMIN — ONDANSETRON HYDROCHLORIDE 4 MG: 2 SOLUTION INTRAMUSCULAR; INTRAVENOUS at 12:41

## 2021-03-26 NOTE — ED NOTES
ATTEMPTED TO CALL [facility] FOR REPORT, NO ANSWER. EMS GIVEN REPORT ON PATIENT AND CHART PRINTED TO PASS TO NURSING HOME      Nikos Tilley RN  03/26/21 [id number]

## 2021-03-26 NOTE — ED PROVIDER NOTES
Subjective   This is a 89-year-old lady with a past medical history of hypertension, hyperlipidemia, blindness who presents the emergency department with a chief complaint of an episode of unresponsiveness.  This happened prior to arrival.  Unclear of the surrounding events although nursing home staff states the last time she did this she coded.  Unclear how long it lasted.  No reported seizure activity.  Upon arrival here she complains of nausea and one episode of nonbloody, nonbilious emesis.  Denies any chest pain, shortness of breath, headache, numbness, weakness, or paresthesias.  It was sudden onset.  Constant.  Moderate.  No exacerbating relieving factors.    Past medical history: Hypertension, hyperlipidemia  Social history: Lives in a nursing home      History provided by:  Patient      Review of Systems   All other systems reviewed and are negative.      Past Medical History:   Diagnosis Date   • Arthritis    • Hyperlipidemia    • Hypertension        Allergies   Allergen Reactions   • Ampicillin Unknown - High Severity       Past Surgical History:   Procedure Laterality Date   • BREAST BIOPSY Right 1970   • HYSTERECTOMY         Family History   Problem Relation Age of Onset   • Breast cancer Maternal Aunt        Social History     Socioeconomic History   • Marital status:      Spouse name: Not on file   • Number of children: Not on file   • Years of education: Not on file   • Highest education level: Not on file   Tobacco Use   • Smoking status: Never Smoker   • Smokeless tobacco: Never Used           Objective   Physical Exam  Vitals and nursing note reviewed.   Constitutional:       General: She is not in acute distress.     Appearance: Normal appearance. She is normal weight. She is not ill-appearing, toxic-appearing or diaphoretic.   HENT:      Head: Normocephalic and atraumatic.      Nose: Nose normal.      Mouth/Throat:      Mouth: Mucous membranes are moist.   Eyes:      Extraocular  Movements: Extraocular movements intact.   Cardiovascular:      Rate and Rhythm: Normal rate and regular rhythm.      Pulses: Normal pulses.      Heart sounds: Normal heart sounds. No murmur heard.   No friction rub. No gallop.    Pulmonary:      Effort: Pulmonary effort is normal. No respiratory distress.      Breath sounds: Normal breath sounds. No stridor. No wheezing, rhonchi or rales.   Abdominal:      General: Abdomen is flat. Bowel sounds are normal. There is no distension.      Palpations: There is no mass.      Tenderness: There is no abdominal tenderness. There is no guarding or rebound.      Hernia: No hernia is present.   Musculoskeletal:         General: No swelling or tenderness. Normal range of motion.      Cervical back: Normal range of motion and neck supple.   Skin:     General: Skin is warm and dry.      Capillary Refill: Capillary refill takes less than 2 seconds.      Coloration: Skin is not jaundiced or pale.      Findings: No bruising, erythema, lesion or rash.   Neurological:      General: No focal deficit present.      Mental Status: She is alert and oriented to person, place, and time.      Comments: Moves all 4 extremities with generalized weakness.  Symmetric throughout.   Psychiatric:         Mood and Affect: Mood normal.         Behavior: Behavior normal.         Thought Content: Thought content normal.         Judgment: Judgment normal.         Procedures           ED Course                                           MDM  Number of Diagnoses or Management Options  Syncope, unspecified syncope type: new and requires workup  Urinary tract infection without hematuria, site unspecified: new and requires workup  Diagnosis management comments: Patient presents with reported syncope.  Upon arrival in no acute distress vital signs are remarkable for hypertension.  IV access is obtained and labs are sent.We were able to get further history from the nursing home.  Per their staff the patient sat  down to have a bowel movement.  She strained, passed out, and woke up immediately.  No seizure-like activity.  They state she is at her baseline.  Labs show urinalysis with potential infection, she has 3 to 6 g in 6-12 whites with small leuks but negative nitrites.  We will go ahead and treat.  She is given Rocephin here.  CBC with no leukocytosis.  Stable anemia.  Troponin undetectable both on arrival and on repeat.  Lipase is normal.  CMP with improving kidney function.  No elevation of the anion gap, no gross electrolyte abnormalities.  COVID-19 testing is negative.  CT scan of the head is unremarkable.  CT scan the abdomen and pelvis with contrast is unremarkable.  CT angiogram of the chest is also unremarkable.  Patient is at her baseline.  No signs of infection here.  She is not tachycardic.  Not hypotensive.  She is afebrile.  Has a slightly elevated lactic acid for which she is given a bolus of fluids which is also to treat her preemptively due to her contrast load with a creatinine of 1.4.  She has a history suggestive of vasovagal syncope.  Swedish syncope risk score is low and puts her in stable for discharge category.  No pulmonary embolism.  No aortic dissection.  Low concern for acute coronary syndrome no chest pain, reassuring ECG, troponin, repeat troponin.  No signs or symptoms to suggest a subarachnoid hemorrhage and CT scan of the head happened within 6 hours of the event.  She has no focal neurologic findings on exam so low concern for stroke.  I honestly believe admitting this patient to the hospital would only worsen her baseline health rather than improve it.  She is at high risk for delirium as well as hospital-acquired infections.  She is at her baseline with a reassuring work-up here.  She is staying in a nursing home who is comfortable taking her back.  She is discharged in good condition with normal vitals and nursing home is given commonsense return precautions which they verbalized  understanding of.       Amount and/or Complexity of Data Reviewed  Clinical lab tests: ordered and reviewed  Tests in the radiology section of CPT®: ordered and reviewed  Decide to obtain previous medical records or to obtain history from someone other than the patient: yes  Independent visualization of images, tracings, or specimens: yes (EKG shows sinus rhythm with a rate of 71 with no ST elevation or depression concerning for acute ischemia, narrow QRS, ID within normal limits, QT within normal limits, no Wellens, no Brugada.)    Risk of Complications, Morbidity, and/or Mortality  Presenting problems: high  Diagnostic procedures: moderate  Management options: high    Patient Progress  Patient progress: improved      Final diagnoses:   Syncope, unspecified syncope type   Urinary tract infection without hematuria, site unspecified       ED Disposition  ED Disposition     ED Disposition Condition Comment    Discharge Stable           Nunu Finch MD  [address] [id number] [phone]    Schedule an appointment as soon as possible for a visit in 2 days           Medication List      New Prescriptions    cefdinir 300 MG capsule  Commonly known as: OMNICEF  Take 1 capsule by mouth 2 (Two) Times a Day for 7 days.           Where to Get Your Medications      You can get these medications from any pharmacy    Bring a paper prescription for each of these medications  · cefdinir 300 MG capsule          Deion Lorenzana MD  03/27/21 9609

## 2021-03-27 LAB — BACTERIA SPEC AEROBE CULT: NO GROWTH

## 2021-03-28 LAB
QT INTERVAL: 418 MS
QTC INTERVAL: 454 MS

## 2022-06-10 ENCOUNTER — TELEPHONE (OUTPATIENT)
Dept: PRIMARY CARE CLINIC | Age: 87
End: 2022-06-10

## 2022-06-11 NOTE — TELEPHONE ENCOUNTER
I spoke with Bobby Francisco, Mrs. Gabe Jacome daughter. Mrs. Negrita Tovar is still a full code. Bobby Francisco says she has been meaning to get up there and do it but she just has not yet. She says that her mother is vaccinated but she is not sure about a booster. She also said that her mother has already had COVID last year and did fine. We discussed the risks and benefits of starting Paxlovid. It would be Monday before they are able to get it. She does have decreased renal function and we will have to do the lower dose because her GFR is 48. She is also on 3 medications that will have to be adjusted if we start the antiviral.  We will have to stop her atorvastatin, decrease her cyclosporine eyedrops from twice a day to once a day and most concerning, decrease her Seroquel which she is on for her severe hallucinations to 1/6 of the dose. That would mean going from 100mg to 12.5 mg twice a day. Her daughter is very concerned about this. After much discussion the decision was made to observe her mother over the weekend. If she starts to worsen then we will start the lower dose corrected for renal impairment on Monday. If she is doing well, we may not treat except supportively. We also discussed the fact that there is a health advisory because of the risk of \"rebound COVID\"which can occur 2 to 8 days after recovery.

## 2023-12-26 ENCOUNTER — LAB REQUISITION (OUTPATIENT)
Dept: LAB | Facility: HOSPITAL | Age: 88
End: 2023-12-26
Payer: MEDICARE

## 2023-12-26 DIAGNOSIS — R55 SYNCOPE AND COLLAPSE: ICD-10-CM

## 2023-12-26 DIAGNOSIS — N18.2 CHRONIC KIDNEY DISEASE, STAGE 2 (MILD): ICD-10-CM

## 2023-12-26 DIAGNOSIS — R11.2 NAUSEA WITH VOMITING, UNSPECIFIED: ICD-10-CM

## 2023-12-26 LAB
ALBUMIN SERPL-MCNC: 3.8 G/DL (ref 3.5–5.2)
ALBUMIN/GLOB SERPL: 1.2 G/DL
ALP SERPL-CCNC: 102 U/L (ref 39–117)
ALT SERPL W P-5'-P-CCNC: 9 U/L (ref 1–33)
ANION GAP SERPL CALCULATED.3IONS-SCNC: 12 MMOL/L (ref 5–15)
AST SERPL-CCNC: 16 U/L (ref 1–32)
BASOPHILS # BLD AUTO: 0.01 10*3/MM3 (ref 0–0.2)
BASOPHILS NFR BLD AUTO: 0.1 % (ref 0–1.5)
BILIRUB SERPL-MCNC: 0.5 MG/DL (ref 0–1.2)
BUN SERPL-MCNC: 24 MG/DL (ref 8–23)
BUN/CREAT SERPL: 18.6 (ref 7–25)
CALCIUM SPEC-SCNC: 8.8 MG/DL (ref 8.2–9.6)
CHLORIDE SERPL-SCNC: 98 MMOL/L (ref 98–107)
CO2 SERPL-SCNC: 23 MMOL/L (ref 22–29)
CREAT SERPL-MCNC: 1.29 MG/DL (ref 0.57–1)
DEPRECATED RDW RBC AUTO: 48 FL (ref 37–54)
EGFRCR SERPLBLD CKD-EPI 2021: 38.5 ML/MIN/1.73
EOSINOPHIL # BLD AUTO: 0.08 10*3/MM3 (ref 0–0.4)
EOSINOPHIL NFR BLD AUTO: 0.9 % (ref 0.3–6.2)
ERYTHROCYTE [DISTWIDTH] IN BLOOD BY AUTOMATED COUNT: 12.8 % (ref 12.3–15.4)
GLOBULIN UR ELPH-MCNC: 3.2 GM/DL
GLUCOSE SERPL-MCNC: 170 MG/DL (ref 65–99)
HCT VFR BLD AUTO: 32.9 % (ref 34–46.6)
HGB BLD-MCNC: 10.2 G/DL (ref 12–15.9)
IMM GRANULOCYTES # BLD AUTO: 0.02 10*3/MM3 (ref 0–0.05)
IMM GRANULOCYTES NFR BLD AUTO: 0.2 % (ref 0–0.5)
LYMPHOCYTES # BLD AUTO: 0.94 10*3/MM3 (ref 0.7–3.1)
LYMPHOCYTES NFR BLD AUTO: 10.9 % (ref 19.6–45.3)
MCH RBC QN AUTO: 31.6 PG (ref 26.6–33)
MCHC RBC AUTO-ENTMCNC: 31 G/DL (ref 31.5–35.7)
MCV RBC AUTO: 101.9 FL (ref 79–97)
MONOCYTES # BLD AUTO: 0.5 10*3/MM3 (ref 0.1–0.9)
MONOCYTES NFR BLD AUTO: 5.8 % (ref 5–12)
NEUTROPHILS NFR BLD AUTO: 7.1 10*3/MM3 (ref 1.7–7)
NEUTROPHILS NFR BLD AUTO: 82.1 % (ref 42.7–76)
NRBC BLD AUTO-RTO: 0 /100 WBC (ref 0–0.2)
PLATELET # BLD AUTO: 233 10*3/MM3 (ref 140–450)
PMV BLD AUTO: 11.2 FL (ref 6–12)
POTASSIUM SERPL-SCNC: 3.4 MMOL/L (ref 3.5–5.2)
PROT SERPL-MCNC: 7 G/DL (ref 6–8.5)
RBC # BLD AUTO: 3.23 10*6/MM3 (ref 3.77–5.28)
SODIUM SERPL-SCNC: 133 MMOL/L (ref 136–145)
WBC NRBC COR # BLD AUTO: 8.65 10*3/MM3 (ref 3.4–10.8)

## 2023-12-26 PROCEDURE — 85025 COMPLETE CBC W/AUTO DIFF WBC: CPT | Performed by: FAMILY MEDICINE

## 2023-12-26 PROCEDURE — 36415 COLL VENOUS BLD VENIPUNCTURE: CPT | Performed by: FAMILY MEDICINE

## 2023-12-26 PROCEDURE — 80053 COMPREHEN METABOLIC PANEL: CPT | Performed by: FAMILY MEDICINE

## 2024-01-19 ENCOUNTER — LAB REQUISITION (OUTPATIENT)
Dept: LAB | Facility: HOSPITAL | Age: 89
End: 2024-01-19
Payer: MEDICARE

## 2024-01-19 DIAGNOSIS — N18.2 CHRONIC KIDNEY DISEASE, STAGE 2 (MILD): ICD-10-CM

## 2024-01-19 DIAGNOSIS — I10 ESSENTIAL (PRIMARY) HYPERTENSION: ICD-10-CM

## 2024-01-19 DIAGNOSIS — D64.9 ANEMIA, UNSPECIFIED: ICD-10-CM

## 2024-01-19 DIAGNOSIS — Z86.16 PERSONAL HISTORY OF COVID-19: ICD-10-CM

## 2024-01-19 LAB
B PARAPERT DNA SPEC QL NAA+PROBE: NOT DETECTED
B PERT DNA SPEC QL NAA+PROBE: NOT DETECTED
C PNEUM DNA NPH QL NAA+NON-PROBE: NOT DETECTED
FLUAV SUBTYP SPEC NAA+PROBE: NOT DETECTED
FLUBV RNA ISLT QL NAA+PROBE: NOT DETECTED
HADV DNA SPEC NAA+PROBE: NOT DETECTED
HCOV 229E RNA SPEC QL NAA+PROBE: NOT DETECTED
HCOV HKU1 RNA SPEC QL NAA+PROBE: NOT DETECTED
HCOV NL63 RNA SPEC QL NAA+PROBE: NOT DETECTED
HCOV OC43 RNA SPEC QL NAA+PROBE: NOT DETECTED
HMPV RNA NPH QL NAA+NON-PROBE: NOT DETECTED
HPIV1 RNA ISLT QL NAA+PROBE: NOT DETECTED
HPIV2 RNA SPEC QL NAA+PROBE: NOT DETECTED
HPIV3 RNA NPH QL NAA+PROBE: NOT DETECTED
HPIV4 P GENE NPH QL NAA+PROBE: NOT DETECTED
M PNEUMO IGG SER IA-ACNC: NOT DETECTED
RHINOVIRUS RNA SPEC NAA+PROBE: NOT DETECTED
RSV RNA NPH QL NAA+NON-PROBE: DETECTED
SARS-COV-2 RNA NPH QL NAA+NON-PROBE: NOT DETECTED

## 2024-01-19 PROCEDURE — 0202U NFCT DS 22 TRGT SARS-COV-2: CPT | Performed by: FAMILY MEDICINE

## 2024-01-24 ENCOUNTER — LAB REQUISITION (OUTPATIENT)
Dept: LAB | Facility: HOSPITAL | Age: 89
End: 2024-01-24
Payer: MEDICARE

## 2024-01-24 DIAGNOSIS — N18.2 CHRONIC KIDNEY DISEASE, STAGE 2 (MILD): ICD-10-CM

## 2024-01-24 DIAGNOSIS — I10 ESSENTIAL (PRIMARY) HYPERTENSION: ICD-10-CM

## 2024-01-24 DIAGNOSIS — D64.9 ANEMIA, UNSPECIFIED: ICD-10-CM

## 2024-01-24 LAB
ANION GAP SERPL CALCULATED.3IONS-SCNC: 11 MMOL/L (ref 5–15)
BUN SERPL-MCNC: 34 MG/DL (ref 8–23)
BUN/CREAT SERPL: 22.4 (ref 7–25)
CALCIUM SPEC-SCNC: 8.5 MG/DL (ref 8.2–9.6)
CHLORIDE SERPL-SCNC: 106 MMOL/L (ref 98–107)
CO2 SERPL-SCNC: 21 MMOL/L (ref 22–29)
CREAT SERPL-MCNC: 1.52 MG/DL (ref 0.57–1)
EGFRCR SERPLBLD CKD-EPI 2021: 31.6 ML/MIN/1.73
GLUCOSE SERPL-MCNC: 87 MG/DL (ref 65–99)
POTASSIUM SERPL-SCNC: 4.1 MMOL/L (ref 3.5–5.2)
SODIUM SERPL-SCNC: 138 MMOL/L (ref 136–145)

## 2024-01-24 PROCEDURE — 36415 COLL VENOUS BLD VENIPUNCTURE: CPT | Performed by: FAMILY MEDICINE

## 2024-01-24 PROCEDURE — 80048 BASIC METABOLIC PNL TOTAL CA: CPT | Performed by: FAMILY MEDICINE

## 2024-05-01 ENCOUNTER — LAB REQUISITION (OUTPATIENT)
Dept: LAB | Facility: HOSPITAL | Age: 89
End: 2024-05-01
Payer: MEDICARE

## 2024-05-01 DIAGNOSIS — E78.5 HYPERLIPIDEMIA, UNSPECIFIED: ICD-10-CM

## 2024-05-01 DIAGNOSIS — N18.2 CHRONIC KIDNEY DISEASE, STAGE 2 (MILD): ICD-10-CM

## 2024-05-01 DIAGNOSIS — I10 ESSENTIAL (PRIMARY) HYPERTENSION: ICD-10-CM

## 2024-05-01 DIAGNOSIS — E78.00 PURE HYPERCHOLESTEROLEMIA, UNSPECIFIED: ICD-10-CM

## 2024-05-01 DIAGNOSIS — D64.9 ANEMIA, UNSPECIFIED: ICD-10-CM

## 2024-05-01 LAB
ALBUMIN SERPL-MCNC: 3.3 G/DL (ref 3.5–5.2)
ALBUMIN/GLOB SERPL: 1 G/DL
ALP SERPL-CCNC: 115 U/L (ref 39–117)
ALT SERPL W P-5'-P-CCNC: 11 U/L (ref 1–33)
ANION GAP SERPL CALCULATED.3IONS-SCNC: 14 MMOL/L (ref 5–15)
ANISOCYTOSIS BLD QL: ABNORMAL
AST SERPL-CCNC: 16 U/L (ref 1–32)
BILIRUB SERPL-MCNC: 0.4 MG/DL (ref 0–1.2)
BUN SERPL-MCNC: 50 MG/DL (ref 8–23)
BUN/CREAT SERPL: 25 (ref 7–25)
CALCIUM SPEC-SCNC: 8.1 MG/DL (ref 8.2–9.6)
CHLORIDE SERPL-SCNC: 110 MMOL/L (ref 98–107)
CHOLEST SERPL-MCNC: 70 MG/DL (ref 0–200)
CO2 SERPL-SCNC: 19 MMOL/L (ref 22–29)
CREAT SERPL-MCNC: 2 MG/DL (ref 0.57–1)
CYTOLOGIST CVX/VAG CYTO: NORMAL
DEPRECATED RDW RBC AUTO: 57.5 FL (ref 37–54)
EGFRCR SERPLBLD CKD-EPI 2021: 22.8 ML/MIN/1.73
EOSINOPHIL # BLD MANUAL: 0.38 10*3/MM3 (ref 0–0.4)
EOSINOPHIL NFR BLD MANUAL: 1 % (ref 0.3–6.2)
ERYTHROCYTE [DISTWIDTH] IN BLOOD BY AUTOMATED COUNT: 14.6 % (ref 12.3–15.4)
GLOBULIN UR ELPH-MCNC: 3.4 GM/DL
GLUCOSE SERPL-MCNC: 111 MG/DL (ref 65–99)
HCT VFR BLD AUTO: 23.3 % (ref 34–46.6)
HDLC SERPL-MCNC: 30 MG/DL (ref 40–60)
HGB BLD-MCNC: 7.3 G/DL (ref 12–15.9)
LDLC SERPL CALC-MCNC: 25 MG/DL (ref 0–100)
LDLC/HDLC SERPL: 0.89 {RATIO}
LYMPHOCYTES # BLD MANUAL: 3.82 10*3/MM3 (ref 0.7–3.1)
LYMPHOCYTES NFR BLD MANUAL: 57.6 % (ref 5–12)
MCH RBC QN AUTO: 33.5 PG (ref 26.6–33)
MCHC RBC AUTO-ENTMCNC: 31.3 G/DL (ref 31.5–35.7)
MCV RBC AUTO: 106.9 FL (ref 79–97)
MONOCYTES # BLD: 21.81 10*3/MM3 (ref 0.1–0.9)
NEUTROPHILS # BLD AUTO: 11.86 10*3/MM3 (ref 1.7–7)
NEUTROPHILS NFR BLD MANUAL: 29.3 % (ref 42.7–76)
NEUTS BAND NFR BLD MANUAL: 2 % (ref 0–5)
PATH INTERP BLD-IMP: NORMAL
PLAT MORPH BLD: NORMAL
PLATELET # BLD AUTO: 203 10*3/MM3 (ref 140–450)
PMV BLD AUTO: 9.6 FL (ref 6–12)
POLYCHROMASIA BLD QL SMEAR: ABNORMAL
POTASSIUM SERPL-SCNC: 3.3 MMOL/L (ref 3.5–5.2)
PROT SERPL-MCNC: 6.7 G/DL (ref 6–8.5)
RBC # BLD AUTO: 2.18 10*6/MM3 (ref 3.77–5.28)
SODIUM SERPL-SCNC: 143 MMOL/L (ref 136–145)
TRIGL SERPL-MCNC: 66 MG/DL (ref 0–150)
VARIANT LYMPHS NFR BLD MANUAL: 10.1 % (ref 19.6–45.3)
VLDLC SERPL-MCNC: 15 MG/DL (ref 5–40)
WBC MORPH BLD: NORMAL
WBC NRBC COR # BLD AUTO: 37.87 10*3/MM3 (ref 3.4–10.8)

## 2024-05-01 PROCEDURE — 36415 COLL VENOUS BLD VENIPUNCTURE: CPT | Performed by: FAMILY MEDICINE

## 2024-05-01 PROCEDURE — 80061 LIPID PANEL: CPT | Performed by: FAMILY MEDICINE

## 2024-05-01 PROCEDURE — 80053 COMPREHEN METABOLIC PANEL: CPT | Performed by: FAMILY MEDICINE

## 2024-05-01 PROCEDURE — 85025 COMPLETE CBC W/AUTO DIFF WBC: CPT | Performed by: FAMILY MEDICINE

## 2024-05-01 PROCEDURE — 85060 BLOOD SMEAR INTERPRETATION: CPT | Performed by: FAMILY MEDICINE

## 2024-05-03 ENCOUNTER — LAB REQUISITION (OUTPATIENT)
Dept: LAB | Facility: HOSPITAL | Age: 89
End: 2024-05-03
Payer: MEDICARE

## 2024-05-03 DIAGNOSIS — N18.2 CHRONIC KIDNEY DISEASE, STAGE 2 (MILD): ICD-10-CM

## 2024-05-03 DIAGNOSIS — J06.9 ACUTE UPPER RESPIRATORY INFECTION, UNSPECIFIED: ICD-10-CM

## 2024-05-03 LAB
ALBUMIN SERPL-MCNC: 3 G/DL (ref 3.5–5.2)
ALBUMIN/GLOB SERPL: 1 G/DL
ALP SERPL-CCNC: 102 U/L (ref 39–117)
ALT SERPL W P-5'-P-CCNC: 11 U/L (ref 1–33)
ANION GAP SERPL CALCULATED.3IONS-SCNC: 15 MMOL/L (ref 5–15)
ANISOCYTOSIS BLD QL: ABNORMAL
AST SERPL-CCNC: 16 U/L (ref 1–32)
BILIRUB SERPL-MCNC: 0.3 MG/DL (ref 0–1.2)
BUN SERPL-MCNC: 61 MG/DL (ref 8–23)
BUN/CREAT SERPL: 28.5 (ref 7–25)
CALCIUM SPEC-SCNC: 7.8 MG/DL (ref 8.2–9.6)
CHLORIDE SERPL-SCNC: 112 MMOL/L (ref 98–107)
CO2 SERPL-SCNC: 20 MMOL/L (ref 22–29)
CREAT SERPL-MCNC: 2.14 MG/DL (ref 0.57–1)
DEPRECATED RDW RBC AUTO: 59.6 FL (ref 37–54)
EGFRCR SERPLBLD CKD-EPI 2021: 21 ML/MIN/1.73
ERYTHROCYTE [DISTWIDTH] IN BLOOD BY AUTOMATED COUNT: 14.8 % (ref 12.3–15.4)
GLOBULIN UR ELPH-MCNC: 3 GM/DL
GLUCOSE SERPL-MCNC: 85 MG/DL (ref 65–99)
HCT VFR BLD AUTO: 20.7 % (ref 34–46.6)
HGB BLD-MCNC: 6.4 G/DL (ref 12–15.9)
HYPOCHROMIA BLD QL: ABNORMAL
LYMPHOCYTES # BLD MANUAL: 2.38 10*3/MM3 (ref 0.7–3.1)
LYMPHOCYTES NFR BLD MANUAL: 29 % (ref 5–12)
MACROCYTES BLD QL SMEAR: ABNORMAL
MCH RBC QN AUTO: 33.9 PG (ref 26.6–33)
MCHC RBC AUTO-ENTMCNC: 30.9 G/DL (ref 31.5–35.7)
MCV RBC AUTO: 109.5 FL (ref 79–97)
MONOCYTES # BLD: 4.31 10*3/MM3 (ref 0.1–0.9)
NEUTROPHILS # BLD AUTO: 8.17 10*3/MM3 (ref 1.7–7)
NEUTROPHILS NFR BLD MANUAL: 40 % (ref 42.7–76)
NEUTS BAND NFR BLD MANUAL: 15 % (ref 0–5)
PLAT MORPH BLD: NORMAL
PLATELET # BLD AUTO: 152 10*3/MM3 (ref 140–450)
PMV BLD AUTO: 9.8 FL (ref 6–12)
POTASSIUM SERPL-SCNC: 3.6 MMOL/L (ref 3.5–5.2)
PROT SERPL-MCNC: 6 G/DL (ref 6–8.5)
RBC # BLD AUTO: 1.89 10*6/MM3 (ref 3.77–5.28)
SODIUM SERPL-SCNC: 147 MMOL/L (ref 136–145)
VARIANT LYMPHS NFR BLD MANUAL: 16 % (ref 19.6–45.3)
WBC MORPH BLD: NORMAL
WBC NRBC COR # BLD AUTO: 14.86 10*3/MM3 (ref 3.4–10.8)

## 2024-05-03 PROCEDURE — 80053 COMPREHEN METABOLIC PANEL: CPT | Performed by: FAMILY MEDICINE

## 2024-05-03 PROCEDURE — 85025 COMPLETE CBC W/AUTO DIFF WBC: CPT | Performed by: FAMILY MEDICINE

## 2024-05-08 ENCOUNTER — LAB REQUISITION (OUTPATIENT)
Dept: LAB | Facility: HOSPITAL | Age: 89
End: 2024-05-08
Payer: MEDICARE

## 2024-05-08 DIAGNOSIS — D64.9 ANEMIA, UNSPECIFIED: ICD-10-CM

## 2024-05-08 DIAGNOSIS — R71.0 PRECIPITOUS DROP IN HEMATOCRIT: ICD-10-CM

## 2024-05-08 LAB
ANISOCYTOSIS BLD QL: ABNORMAL
BASOPHILS # BLD MANUAL: 0 10*3/MM3 (ref 0–0.2)
BASOPHILS NFR BLD MANUAL: 0 % (ref 0–1.5)
DEPRECATED RDW RBC AUTO: 54.8 FL (ref 37–54)
EOSINOPHIL # BLD MANUAL: 0.15 10*3/MM3 (ref 0–0.4)
EOSINOPHIL NFR BLD MANUAL: 1 % (ref 0.3–6.2)
ERYTHROCYTE [DISTWIDTH] IN BLOOD BY AUTOMATED COUNT: 14.1 % (ref 12.3–15.4)
HCT VFR BLD AUTO: 23.9 % (ref 34–46.6)
HGB BLD-MCNC: 7.5 G/DL (ref 12–15.9)
LYMPHOCYTES # BLD MANUAL: 2.49 10*3/MM3 (ref 0.7–3.1)
LYMPHOCYTES NFR BLD MANUAL: 15.2 % (ref 5–12)
MACROCYTES BLD QL SMEAR: ABNORMAL
MCH RBC QN AUTO: 33.3 PG (ref 26.6–33)
MCHC RBC AUTO-ENTMCNC: 31.4 G/DL (ref 31.5–35.7)
MCV RBC AUTO: 106.2 FL (ref 79–97)
MONOCYTES # BLD: 2.34 10*3/MM3 (ref 0.1–0.9)
MYELOCYTES NFR BLD MANUAL: 1 % (ref 0–0)
NEUTROPHILS # BLD AUTO: 10.26 10*3/MM3 (ref 1.7–7)
NEUTROPHILS NFR BLD MANUAL: 58.6 % (ref 42.7–76)
NEUTS BAND NFR BLD MANUAL: 8.1 % (ref 0–5)
PLAT MORPH BLD: NORMAL
PLATELET # BLD AUTO: 185 10*3/MM3 (ref 140–450)
PMV BLD AUTO: 10 FL (ref 6–12)
POIKILOCYTOSIS BLD QL SMEAR: ABNORMAL
RBC # BLD AUTO: 2.25 10*6/MM3 (ref 3.77–5.28)
VARIANT LYMPHS NFR BLD MANUAL: 16.2 % (ref 19.6–45.3)
WBC MORPH BLD: NORMAL
WBC NRBC COR # BLD AUTO: 15.39 10*3/MM3 (ref 3.4–10.8)

## 2024-05-08 PROCEDURE — 36415 COLL VENOUS BLD VENIPUNCTURE: CPT | Performed by: FAMILY MEDICINE

## 2024-05-08 PROCEDURE — 85025 COMPLETE CBC W/AUTO DIFF WBC: CPT | Performed by: FAMILY MEDICINE

## 2024-06-03 ENCOUNTER — LAB REQUISITION (OUTPATIENT)
Dept: LAB | Facility: HOSPITAL | Age: 89
End: 2024-06-03
Payer: MEDICARE

## 2024-06-03 DIAGNOSIS — D64.9 ANEMIA, UNSPECIFIED: ICD-10-CM

## 2024-06-03 LAB
ANISOCYTOSIS BLD QL: ABNORMAL
BASOPHILS # BLD MANUAL: 0 10*3/MM3 (ref 0–0.2)
BASOPHILS NFR BLD MANUAL: 0 % (ref 0–1.5)
BURR CELLS BLD QL SMEAR: ABNORMAL
DEPRECATED RDW RBC AUTO: 55.1 FL (ref 37–54)
EOSINOPHIL # BLD MANUAL: 0 10*3/MM3 (ref 0–0.4)
EOSINOPHIL NFR BLD MANUAL: 0 % (ref 0.3–6.2)
ERYTHROCYTE [DISTWIDTH] IN BLOOD BY AUTOMATED COUNT: 14.5 % (ref 12.3–15.4)
HCT VFR BLD AUTO: 18.8 % (ref 34–46.6)
HGB BLD-MCNC: 6 G/DL (ref 12–15.9)
LYMPHOCYTES # BLD MANUAL: 7.38 10*3/MM3 (ref 0.7–3.1)
LYMPHOCYTES NFR BLD MANUAL: 31.3 % (ref 5–12)
MACROCYTES BLD QL SMEAR: ABNORMAL
MCH RBC QN AUTO: 33.5 PG (ref 26.6–33)
MCHC RBC AUTO-ENTMCNC: 31.9 G/DL (ref 31.5–35.7)
MCV RBC AUTO: 105 FL (ref 79–97)
METAMYELOCYTES NFR BLD MANUAL: 1 % (ref 0–0)
MONOCYTES # BLD: 4.71 10*3/MM3 (ref 0.1–0.9)
MYELOCYTES NFR BLD MANUAL: 1 % (ref 0–0)
NEUTROPHILS # BLD AUTO: 2.67 10*3/MM3 (ref 1.7–7)
NEUTROPHILS NFR BLD MANUAL: 15.6 % (ref 42.7–76)
NEUTS BAND NFR BLD MANUAL: 2.1 % (ref 0–5)
NRBC SPEC MANUAL: 2.1 /100 WBC (ref 0–0.2)
OVALOCYTES BLD QL SMEAR: ABNORMAL
PLATELET # BLD AUTO: 13 10*3/MM3 (ref 140–450)
PMV BLD AUTO: 14 FL (ref 6–12)
POIKILOCYTOSIS BLD QL SMEAR: ABNORMAL
RBC # BLD AUTO: 1.79 10*6/MM3 (ref 3.77–5.28)
SMALL PLATELETS BLD QL SMEAR: ABNORMAL
VARIANT LYMPHS NFR BLD MANUAL: 49 % (ref 19.6–45.3)
WBC MORPH BLD: NORMAL
WBC NRBC COR # BLD AUTO: 15.06 10*3/MM3 (ref 3.4–10.8)

## 2024-06-03 PROCEDURE — 36415 COLL VENOUS BLD VENIPUNCTURE: CPT | Performed by: FAMILY MEDICINE

## 2024-06-03 PROCEDURE — 85025 COMPLETE CBC W/AUTO DIFF WBC: CPT | Performed by: FAMILY MEDICINE

## 2024-06-05 ENCOUNTER — HOSPITAL ENCOUNTER (INPATIENT)
Age: 89
LOS: 1 days | Discharge: HOSPICE | End: 2024-06-06
Attending: STUDENT IN AN ORGANIZED HEALTH CARE EDUCATION/TRAINING PROGRAM | Admitting: HOSPITALIST
Payer: MEDICARE

## 2024-06-05 ENCOUNTER — APPOINTMENT (OUTPATIENT)
Dept: GENERAL RADIOLOGY | Age: 89
End: 2024-06-05
Payer: MEDICARE

## 2024-06-05 ENCOUNTER — TELEPHONE (OUTPATIENT)
Dept: PRIMARY CARE CLINIC | Age: 89
End: 2024-06-05

## 2024-06-05 DIAGNOSIS — D64.9 SEVERE ANEMIA: Primary | ICD-10-CM

## 2024-06-05 DIAGNOSIS — N17.9 ACUTE RENAL FAILURE, UNSPECIFIED ACUTE RENAL FAILURE TYPE (HCC): ICD-10-CM

## 2024-06-05 DIAGNOSIS — D69.6 THROMBOCYTOPENIA (HCC): ICD-10-CM

## 2024-06-05 DIAGNOSIS — E83.51 HYPOCALCEMIA: ICD-10-CM

## 2024-06-05 PROBLEM — D75.89 BICYTOPENIA: Status: ACTIVE | Noted: 2024-06-05

## 2024-06-05 PROBLEM — F03.90 DEMENTIA (HCC): Status: ACTIVE | Noted: 2024-01-01

## 2024-06-05 PROBLEM — Z66 DNR (DO NOT RESUSCITATE): Status: ACTIVE | Noted: 2024-01-01

## 2024-06-05 PROBLEM — N19 RENAL FAILURE: Status: ACTIVE | Noted: 2024-06-05

## 2024-06-05 LAB
ABO/RH: NORMAL
ALBUMIN SERPL-MCNC: 3.4 G/DL (ref 3.5–5.2)
ALP SERPL-CCNC: 101 U/L (ref 35–104)
ALT SERPL-CCNC: 44 U/L (ref 5–33)
ANION GAP SERPL CALCULATED.3IONS-SCNC: 23 MMOL/L (ref 7–19)
ANTIBODY SCREEN: NORMAL
AST SERPL-CCNC: 60 U/L (ref 5–32)
BASOPHILS # BLD: 0 K/UL (ref 0–0.2)
BASOPHILS NFR BLD: 0 % (ref 0–1)
BILIRUB SERPL-MCNC: 1.1 MG/DL (ref 0.2–1.2)
BLOOD BANK DISPENSE STATUS: NORMAL
BLOOD BANK PRODUCT CODE: NORMAL
BPU ID: NORMAL
BUN SERPL-MCNC: 103 MG/DL (ref 8–23)
CALCIUM SERPL-MCNC: 6 MG/DL (ref 8.2–9.6)
CHLORIDE SERPL-SCNC: 104 MMOL/L (ref 98–111)
CO2 SERPL-SCNC: 13 MMOL/L (ref 22–29)
CREAT SERPL-MCNC: 4.2 MG/DL (ref 0.5–0.9)
DESCRIPTION BLOOD BANK: NORMAL
EOSINOPHIL # BLD: 0.06 K/UL (ref 0–0.6)
EOSINOPHIL NFR BLD: 1 % (ref 0–5)
ERYTHROCYTE [DISTWIDTH] IN BLOOD BY AUTOMATED COUNT: 14 % (ref 11.5–14.5)
GLUCOSE SERPL-MCNC: 125 MG/DL (ref 74–109)
HCT VFR BLD AUTO: 15 % (ref 37–47)
HGB BLD-MCNC: 4.9 G/DL (ref 12–16)
IMM GRANULOCYTES # BLD: 0.2 K/UL
INR PPP: 1.33 (ref 0.88–1.18)
LYMPHOCYTES # BLD: 2.8 K/UL (ref 1.1–4.5)
LYMPHOCYTES NFR BLD: 45 % (ref 20–40)
MACROCYTES BLD QL SMEAR: ABNORMAL
MCH RBC QN AUTO: 34.5 PG (ref 27–31)
MCHC RBC AUTO-ENTMCNC: 32.7 G/DL (ref 33–37)
MCV RBC AUTO: 105.6 FL (ref 81–99)
METAMYELOCYTES NFR BLD MANUAL: 2 %
MONOCYTES # BLD: 1.1 K/UL (ref 0–0.9)
MONOCYTES NFR BLD: 18 % (ref 0–10)
NEUTROPHILS # BLD: 1.9 K/UL (ref 1.5–7.5)
NEUTS BAND NFR BLD MANUAL: 5 % (ref 0–5)
NEUTS SEG NFR BLD: 26 % (ref 50–65)
PLATELET # BLD AUTO: 22 K/UL (ref 130–400)
PLATELET SLIDE REVIEW: ABNORMAL
PMV BLD AUTO: 12.1 FL (ref 9.4–12.3)
POTASSIUM SERPL-SCNC: 5 MMOL/L (ref 3.5–5)
PROT SERPL-MCNC: 7 G/DL (ref 6.6–8.7)
PROTHROMBIN TIME: 16.1 SEC (ref 12–14.6)
RBC # BLD AUTO: 1.42 M/UL (ref 4.2–5.4)
SODIUM SERPL-SCNC: 140 MMOL/L (ref 136–145)
VARIANT LYMPHS NFR BLD: 3 % (ref 0–8)
WBC # BLD AUTO: 5.9 K/UL (ref 4.8–10.8)

## 2024-06-05 PROCEDURE — 30233N1 TRANSFUSION OF NONAUTOLOGOUS RED BLOOD CELLS INTO PERIPHERAL VEIN, PERCUTANEOUS APPROACH: ICD-10-PCS | Performed by: HOSPITALIST

## 2024-06-05 PROCEDURE — 86900 BLOOD TYPING SEROLOGIC ABO: CPT

## 2024-06-05 PROCEDURE — 86901 BLOOD TYPING SEROLOGIC RH(D): CPT

## 2024-06-05 PROCEDURE — 1200000000 HC SEMI PRIVATE

## 2024-06-05 PROCEDURE — 6360000002 HC RX W HCPCS: Performed by: STUDENT IN AN ORGANIZED HEALTH CARE EDUCATION/TRAINING PROGRAM

## 2024-06-05 PROCEDURE — 71045 X-RAY EXAM CHEST 1 VIEW: CPT

## 2024-06-05 PROCEDURE — 99285 EMERGENCY DEPT VISIT HI MDM: CPT

## 2024-06-05 PROCEDURE — 2580000003 HC RX 258: Performed by: STUDENT IN AN ORGANIZED HEALTH CARE EDUCATION/TRAINING PROGRAM

## 2024-06-05 PROCEDURE — 80053 COMPREHEN METABOLIC PANEL: CPT

## 2024-06-05 PROCEDURE — 36430 TRANSFUSION BLD/BLD COMPNT: CPT

## 2024-06-05 PROCEDURE — 86850 RBC ANTIBODY SCREEN: CPT

## 2024-06-05 PROCEDURE — 85025 COMPLETE CBC W/AUTO DIFF WBC: CPT

## 2024-06-05 PROCEDURE — P9040 RBC LEUKOREDUCED IRRADIATED: HCPCS

## 2024-06-05 PROCEDURE — 85610 PROTHROMBIN TIME: CPT

## 2024-06-05 PROCEDURE — 86923 COMPATIBILITY TEST ELECTRIC: CPT

## 2024-06-05 PROCEDURE — 36415 COLL VENOUS BLD VENIPUNCTURE: CPT

## 2024-06-05 RX ORDER — SODIUM CHLORIDE 0.9 % (FLUSH) 0.9 %
5-40 SYRINGE (ML) INJECTION PRN
Status: DISCONTINUED | OUTPATIENT
Start: 2024-06-05 | End: 2024-06-06 | Stop reason: HOSPADM

## 2024-06-05 RX ORDER — CALCIUM CARBONATE 500 MG/1
500 TABLET, CHEWABLE ORAL 3 TIMES DAILY PRN
Status: DISCONTINUED | OUTPATIENT
Start: 2024-06-05 | End: 2024-06-06 | Stop reason: HOSPADM

## 2024-06-05 RX ORDER — PANTOPRAZOLE SODIUM 40 MG/10ML
40 INJECTION, POWDER, LYOPHILIZED, FOR SOLUTION INTRAVENOUS DAILY
Status: DISCONTINUED | OUTPATIENT
Start: 2024-06-06 | End: 2024-06-06 | Stop reason: HOSPADM

## 2024-06-05 RX ORDER — CALCIUM GLUCONATE 94 MG/ML
1000 INJECTION, SOLUTION INTRAVENOUS ONCE
Status: COMPLETED | OUTPATIENT
Start: 2024-06-05 | End: 2024-06-05

## 2024-06-05 RX ORDER — MECOBALAMIN 5000 MCG
5 TABLET,DISINTEGRATING ORAL NIGHTLY PRN
Status: DISCONTINUED | OUTPATIENT
Start: 2024-06-05 | End: 2024-06-06 | Stop reason: HOSPADM

## 2024-06-05 RX ORDER — SODIUM CHLORIDE 0.9 % (FLUSH) 0.9 %
5-40 SYRINGE (ML) INJECTION EVERY 12 HOURS SCHEDULED
Status: DISCONTINUED | OUTPATIENT
Start: 2024-06-05 | End: 2024-06-06 | Stop reason: HOSPADM

## 2024-06-05 RX ORDER — SODIUM CHLORIDE 9 MG/ML
INJECTION, SOLUTION INTRAVENOUS PRN
Status: DISCONTINUED | OUTPATIENT
Start: 2024-06-05 | End: 2024-06-06 | Stop reason: HOSPADM

## 2024-06-05 RX ORDER — 0.9 % SODIUM CHLORIDE 0.9 %
500 INTRAVENOUS SOLUTION INTRAVENOUS ONCE
Status: COMPLETED | OUTPATIENT
Start: 2024-06-05 | End: 2024-06-05

## 2024-06-05 RX ORDER — POLYETHYLENE GLYCOL 3350 17 G/17G
17 POWDER, FOR SOLUTION ORAL DAILY PRN
Status: DISCONTINUED | OUTPATIENT
Start: 2024-06-05 | End: 2024-06-06 | Stop reason: HOSPADM

## 2024-06-05 RX ORDER — ONDANSETRON 2 MG/ML
4 INJECTION INTRAMUSCULAR; INTRAVENOUS EVERY 6 HOURS PRN
Status: DISCONTINUED | OUTPATIENT
Start: 2024-06-05 | End: 2024-06-06 | Stop reason: HOSPADM

## 2024-06-05 RX ORDER — MECLIZINE HYDROCHLORIDE 25 MG/1
25 TABLET ORAL EVERY 8 HOURS PRN
Status: DISCONTINUED | OUTPATIENT
Start: 2024-06-05 | End: 2024-06-06 | Stop reason: HOSPADM

## 2024-06-05 RX ORDER — ONDANSETRON 4 MG/1
4 TABLET, ORALLY DISINTEGRATING ORAL EVERY 6 HOURS PRN
Status: DISCONTINUED | OUTPATIENT
Start: 2024-06-05 | End: 2024-06-06 | Stop reason: HOSPADM

## 2024-06-05 RX ORDER — SODIUM CHLORIDE 9 MG/ML
INJECTION, SOLUTION INTRAVENOUS PRN
Status: DISCONTINUED | OUTPATIENT
Start: 2024-06-05 | End: 2024-06-06

## 2024-06-05 RX ORDER — AMLODIPINE BESYLATE 10 MG/1
10 TABLET ORAL DAILY
Status: DISCONTINUED | OUTPATIENT
Start: 2024-06-06 | End: 2024-06-06 | Stop reason: HOSPADM

## 2024-06-05 RX ORDER — ACETAMINOPHEN 325 MG/1
650 TABLET ORAL EVERY 4 HOURS PRN
Status: DISCONTINUED | OUTPATIENT
Start: 2024-06-05 | End: 2024-06-06 | Stop reason: HOSPADM

## 2024-06-05 RX ADMIN — CALCIUM GLUCONATE 1000 MG: 98 INJECTION, SOLUTION INTRAVENOUS at 19:19

## 2024-06-05 RX ADMIN — SODIUM CHLORIDE 500 ML: 9 INJECTION, SOLUTION INTRAVENOUS at 19:42

## 2024-06-05 ASSESSMENT — ENCOUNTER SYMPTOMS
SORE THROAT: 0
SHORTNESS OF BREATH: 0
NAUSEA: 0
COUGH: 0
EYE PAIN: 0
VOMITING: 0
DIARRHEA: 0
EYE REDNESS: 0
CHEST TIGHTNESS: 0
ABDOMINAL PAIN: 0

## 2024-06-05 NOTE — PLAN OF CARE
SUBJECTIVE:    EP:  Mrs. Cecily Amato is a 89 year old blond demented lady from nursing home. She had presented from the nursing home very weak. Her Hb had been \"6 something in the office and her platelet count was 18\". Her daughter was counseled by the PCP. They tried to transfuse at [place] but they had no blood for her here, so they shipped her here. We are transfusing her already in the ED here. The patient's mother had aplastic anemia so the family knows what it is. She is in renal failure. She is hemodynamically stable other than a heart rate of 120s-130s. She has been saturating 100%. She is also hypocalcemic.     She is a DNR patient.       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  [facility]  Will defer on Hem/Onc consult as has stated no Bone Marrow will be done and to transfuse once  ED already transfusing  CBC & Chmitry in AM  Palliative Care Consult in AM  NPO for now, will advance to a iet when we know what diet she can eat    Renal Failure:  Chemistry in AM  Urine Na/Cr/OSM/EOS  Renal US in AM  No NSAIDs  Defer on renal consult for now  Elevate HoB 30-45  Elevate legs to prevent sliding down in bed and aid venous return  Daily Weight  Strict Is and Os  IVF with NaHCO3 solution of 150meq NaHCO3 in a liter of sterile water @ 66cc/h for gentle hydration    Chronic Medical Problems:  Continue home regimen as indicated  As per PCP notes in Care Everywhere has: \"Famotidine 20\", \"Amlodipine 10\" or \"Norvasc 10\", & \"Nystatin\" as home meds    Supportive and Prophylactic Txx:  DVT PPx: nothing needed at this time as she is Thrombocytopenic  GI (PUD) PPx: is on H2 blocker as per home - will  place on Protonix as per renal failure  PT: defer for now      Case d/w EP in detail & Hospitalist NP  Chart reviewed   Orders entered by me with CPOE

## 2024-06-05 NOTE — ED PROVIDER NOTES
[facility] EMERGENCY DEPT  eMERGENCY dEPARTMENT eNCOUnter      Pt Name: Cecily Amato  MRN: [id number]  Birthdate 7/3/1929  Date of evaluation: 2024  Provider: Ros Todd MD    CHIEF COMPLAINT       Chief Complaint   Patient presents with    Abnormal Lab     Here from [facility]. HGB low and needs blood         HISTORY OF PRESENT ILLNESS   (Location/Symptom, Timing/Onset,Context/Setting, Quality, Duration, Modifying Factors, Severity)  Note limiting factors.     HPI    Cecily Amato is a 89 y.o. female with PMH of blindness, dementia, hypertension, hyperlipidemia, GERD who presents to the emergency department with CC of abnormal labs.  Patient initially presented to her PCP, Dr. Katheryn Salinas, this morning with generalized weakness and lethargy.  She was found to have severe anemia and thrombocytopenia.  Dr. Salinas reports that several months ago, patient also had an abnormal white count of 56.  She has not had any blood loss, but the patient's mother  from aplastic anemia.  Dr. 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  Daughter did not want a bone marrow biopsy, but did want transfusion because she felt like she was \"giving up\" if she did not do anything.  The patient was evaluated first at [facility], where she was found to have hemoglobin of 4.9 and platelets of 18.  They attempted to transfuse her there, but she had antibodies in her blood and they did not have any blood available for transfusion that was safe for her.  There was going to be a 3-day wait to get blood from another facility, so patient was transferred to [facility] instead.    Daughter reports that at baseline, patient has severe dementia and blindness, and overall poor quality of life.  She has been very lethargic, not responding  TABLET    Take 25 mg by mouth 3 times daily as needed.    METOPROLOL SUCCINATE (TOPROL XL) 100 MG EXTENDED RELEASE TABLET    TAKE ONE TABLET BY MOUTH AT 0900    MYRBETRIQ 50 MG TB24    TAKE ONE TABLET BY MOUTH AT 0900    OXYBUTYNIN (DITROPAN) 5 MG TABLET    TAKE ONE TABLET BY MOUTH TWICE DAILY    QUETIAPINE (SEROQUEL) 100 MG TABLET    Take 50 mg by mouth 2 times daily.    QUETIAPINE (SEROQUEL) 25 MG TABLET    TAKE 1/2 TABLET BY MOUTH EVERY MORNING    SENNA (SENOKOT) 8.6 MG TABS TABLET    TAKE ONE TABLET BY MOUTH AT 0900 and TAKE ONE TABLET AT 2100    TOLTERODINE (DETROL LA) 4 MG ER CAPSULE    Take 4 mg by mouth daily.    TRAMADOL (ULTRAM) 50 MG TABLET    Take 1 po BID PRN x 1 week and the 1 po q day PRN x 1 week       ALLERGIES     Ampicillin    FAMILY HISTORY     History reviewed. No pertinent family history.       SOCIAL HISTORY       Social History     Socioeconomic History    Marital status:      Spouse name: None    Number of children: None    Years of education: None    Highest education level: None   Tobacco Use    Smokeless tobacco: Never   Substance and Sexual Activity    Alcohol use: No    Drug use: No       SCREENINGS    [place] Coma Scale  Eye Opening: Spontaneous  Best Verbal Response: Confused  Best Motor Response: Withdraws from pain  [place] Coma Scale Score: 12        PHYSICAL EXAM    (up to 7 for level 4, 8 or more for level 5)     ED Triage Vitals   BP Temp Temp src Pulse Respirations SpO2 Height Weight   06/05/24 1720 06/05/24 1750 -- 06/05/24 1720 06/05/24 1720 06/05/24 1720 -- --   (!) 146/55 99 °F (37.2 °C)  (!) 117 20 100 %         Physical Exam  Vitals and nursing note reviewed.   Constitutional:       Appearance: She is ill-appearing. She is not toxic-appearing.   HENT:      Head: Normocephalic and atraumatic.      Right Ear: External ear normal.      Left Ear: External ear normal.      Nose: No congestion.      Mouth/Throat:      Mouth: Mucous membranes are moist.      Pharynx:

## 2024-06-05 NOTE — TELEPHONE ENCOUNTER
Johana with [facility] needs to talk to you about the order for whole blood. She has questions. Call [phone]

## 2024-06-05 NOTE — CONSENT
Informed Consent for Blood Component Transfusion Note    I have discussed with the daughter the rationale for blood component transfusion; its benefits in treating or preventing fatigue, organ damage, or death; and its risk which includes mild transfusion reactions, rare risk of blood borne infection, or more serious but rare reactions. I have discussed the alternatives to transfusion, including the risk and consequences of not receiving transfusion. The daughter had an opportunity to ask questions and had agreed to proceed with transfusion of blood components.    Electronically signed by Ros Todd MD on 6/5/24 at 6:11 PM CDT

## 2024-06-05 NOTE — ED NOTES
Pt arrives to ED saturated in urine. Pt left hand is swollen and discolored to blue- purple hue. EMS states and IV was attempted on that hand. Pt cleaned up with new brief applied as well as new gown.

## 2024-06-06 VITALS
HEART RATE: 109 BPM | DIASTOLIC BLOOD PRESSURE: 84 MMHG | TEMPERATURE: 98.7 F | SYSTOLIC BLOOD PRESSURE: 154 MMHG | RESPIRATION RATE: 14 BRPM | OXYGEN SATURATION: 98 %

## 2024-06-06 PROBLEM — Z51.5 PALLIATIVE CARE PATIENT: Status: ACTIVE | Noted: 2024-06-06

## 2024-06-06 LAB
ALBUMIN SERPL-MCNC: 3.1 G/DL (ref 3.5–5.2)
ALP SERPL-CCNC: 100 U/L (ref 35–104)
ALT SERPL-CCNC: 49 U/L (ref 5–33)
ANION GAP SERPL CALCULATED.3IONS-SCNC: 22 MMOL/L (ref 7–19)
ANISOCYTOSIS BLD QL SMEAR: ABNORMAL
AST SERPL-CCNC: 71 U/L (ref 5–32)
BASOPHILS # BLD: 0.1 K/UL (ref 0–0.2)
BASOPHILS NFR BLD: 1 % (ref 0–1)
BILIRUB SERPL-MCNC: 1.9 MG/DL (ref 0.2–1.2)
BUN SERPL-MCNC: 96 MG/DL (ref 8–23)
CALCIUM SERPL-MCNC: 6.4 MG/DL (ref 8.2–9.6)
CHLORIDE SERPL-SCNC: 109 MMOL/L (ref 98–111)
CO2 SERPL-SCNC: 12 MMOL/L (ref 22–29)
CREAT SERPL-MCNC: 3.4 MG/DL (ref 0.5–0.9)
EOSINOPHIL # BLD: 0 K/UL (ref 0–0.6)
EOSINOPHIL NFR BLD: 0 % (ref 0–5)
ERYTHROCYTE [DISTWIDTH] IN BLOOD BY AUTOMATED COUNT: 20.9 % (ref 11.5–14.5)
GLUCOSE BLD-MCNC: 114 MG/DL (ref 70–99)
GLUCOSE SERPL-MCNC: 108 MG/DL (ref 74–109)
HCT VFR BLD AUTO: 25.6 % (ref 37–47)
HGB BLD-MCNC: 8.5 G/DL (ref 12–16)
IMM GRANULOCYTES # BLD: 0.3 K/UL
LYMPHOCYTES # BLD: 2.2 K/UL (ref 1.1–4.5)
LYMPHOCYTES NFR BLD: 32 % (ref 20–40)
MCH RBC QN AUTO: 30.7 PG (ref 27–31)
MCHC RBC AUTO-ENTMCNC: 33.2 G/DL (ref 33–37)
MCV RBC AUTO: 92.4 FL (ref 81–99)
METAMYELOCYTES NFR BLD MANUAL: 1 %
MONOCYTES # BLD: 2 K/UL (ref 0–0.9)
MONOCYTES NFR BLD: 30 % (ref 0–10)
NEUTROPHILS # BLD: 2.4 K/UL (ref 1.5–7.5)
NEUTS BAND NFR BLD MANUAL: 9 % (ref 0–5)
NEUTS SEG NFR BLD: 26 % (ref 50–65)
PERFORMED ON: ABNORMAL
PLATELET # BLD AUTO: 23 K/UL (ref 130–400)
PLATELET SLIDE REVIEW: ABNORMAL
PMV BLD AUTO: 12.9 FL (ref 9.4–12.3)
POTASSIUM SERPL-SCNC: 4.9 MMOL/L (ref 3.5–5)
PROT SERPL-MCNC: 7 G/DL (ref 6.6–8.7)
RBC # BLD AUTO: 2.77 M/UL (ref 4.2–5.4)
SODIUM SERPL-SCNC: 143 MMOL/L (ref 136–145)
VARIANT LYMPHS NFR BLD: 1 % (ref 0–8)
WBC # BLD AUTO: 6.8 K/UL (ref 4.8–10.8)

## 2024-06-06 PROCEDURE — 82962 GLUCOSE BLOOD TEST: CPT

## 2024-06-06 PROCEDURE — 6360000002 HC RX W HCPCS: Performed by: HOSPITALIST

## 2024-06-06 PROCEDURE — 36430 TRANSFUSION BLD/BLD COMPNT: CPT

## 2024-06-06 PROCEDURE — 2580000003 HC RX 258: Performed by: HOSPITALIST

## 2024-06-06 PROCEDURE — 85025 COMPLETE CBC W/AUTO DIFF WBC: CPT

## 2024-06-06 PROCEDURE — 36415 COLL VENOUS BLD VENIPUNCTURE: CPT

## 2024-06-06 PROCEDURE — 99223 1ST HOSP IP/OBS HIGH 75: CPT

## 2024-06-06 PROCEDURE — 80053 COMPREHEN METABOLIC PANEL: CPT

## 2024-06-06 PROCEDURE — 2500000003 HC RX 250 WO HCPCS: Performed by: STUDENT IN AN ORGANIZED HEALTH CARE EDUCATION/TRAINING PROGRAM

## 2024-06-06 PROCEDURE — 2580000003 HC RX 258: Performed by: STUDENT IN AN ORGANIZED HEALTH CARE EDUCATION/TRAINING PROGRAM

## 2024-06-06 PROCEDURE — C9113 INJ PANTOPRAZOLE SODIUM, VIA: HCPCS | Performed by: HOSPITALIST

## 2024-06-06 PROCEDURE — 94760 N-INVAS EAR/PLS OXIMETRY 1: CPT

## 2024-06-06 RX ORDER — DEXTROSE MONOHYDRATE 100 MG/ML
INJECTION, SOLUTION INTRAVENOUS CONTINUOUS PRN
Status: DISCONTINUED | OUTPATIENT
Start: 2024-06-06 | End: 2024-06-06 | Stop reason: HOSPADM

## 2024-06-06 RX ORDER — LABETALOL HYDROCHLORIDE 5 MG/ML
10 INJECTION, SOLUTION INTRAVENOUS EVERY 4 HOURS PRN
Status: DISCONTINUED | OUTPATIENT
Start: 2024-06-06 | End: 2024-06-06 | Stop reason: HOSPADM

## 2024-06-06 RX ORDER — CALCIUM GLUCONATE 20 MG/ML
2000 INJECTION, SOLUTION INTRAVENOUS ONCE
Status: DISCONTINUED | OUTPATIENT
Start: 2024-06-06 | End: 2024-06-06

## 2024-06-06 RX ORDER — CALCIUM GLUCONATE 20 MG/ML
1000 INJECTION, SOLUTION INTRAVENOUS ONCE
Status: DISCONTINUED | OUTPATIENT
Start: 2024-06-06 | End: 2024-06-06

## 2024-06-06 RX ORDER — CALCIUM GLUCONATE 94 MG/ML
2000 INJECTION, SOLUTION INTRAVENOUS ONCE
Status: DISCONTINUED | OUTPATIENT
Start: 2024-06-06 | End: 2024-06-06 | Stop reason: SDUPTHER

## 2024-06-06 RX ADMIN — SODIUM CHLORIDE, PRESERVATIVE FREE 10 ML: 5 INJECTION INTRAVENOUS at 11:05

## 2024-06-06 RX ADMIN — SODIUM BICARBONATE: 84 INJECTION, SOLUTION INTRAVENOUS at 10:57

## 2024-06-06 RX ADMIN — PANTOPRAZOLE SODIUM 40 MG: 40 INJECTION, POWDER, FOR SOLUTION INTRAVENOUS at 11:06

## 2024-06-06 NOTE — ED NOTES
ED TO INPATIENT SBAR HANDOFF    Patient Name: Cecily Amato   : 7/3/1929  89 y.o.   Family/Caregiver Present: No  Code Status Order: No Order    C-SSRS:    Sitter No  Restraints:         Situation  Chief Complaint:   Chief Complaint   Patient presents with    Abnormal Lab     Here from [facility]. HGB low and needs blood     Patient Diagnosis: Acute on chronic anemia [D64.9]     Brief Description of Patient's Condition: presents to the emergency department with CC of abnormal labs.  Patient initially presented to her PCP, Dr. Katheryn Salinas, this morning with generalized weakness and lethargy.  She was found to have severe anemia and thrombocytopenia.  Dr. Salinas reports that several months ago, patient also had an abnormal white count of 56. Daughter did not want a bone marrow biopsy, but did want transfusion because she felt like she was \"giving up\" if she did not do anything. The patient was evaluated first at [facility], where she was found to have hemoglobin of 4.9 and platelets of 18.  They attempted to transfuse her there, but she had antibodies in her blood and they did not have any blood available for transfusion that was safe for her.  There was going to be a 3-day wait to get blood from another facility, so patient was transferred to [facility] instead.   Daughter reports that at baseline, patient has severe dementia and blindness, and overall poor quality of life.  She has been very lethargic, not responding worsening over the last week. She understands that the patient is critically ill, but at this time she would like to proceed with transfusion and limited workup.  Mental Status: disoriented  Arrived from: nursing home    Imaging:   XR CHEST PORTABLE    (Results Pending)   US RENAL COMPLETE    (Results Pending)     COVID-19 Results:   Internal Administration   First Dose COVID-19, PFIZER PURPLE top, DILUTE for use, (age 12 y+), 30mcg/0.3mL  2021   Second Dose          (gastroesophageal reflux disease)     Hallucination     Hypercholesteremia     Hypertension     Urge incontinence        Assessment  Vitals: Level of Consciousness: Responds to pain (2)   Vitals:    06/05/24 1919 06/05/24 1923 06/1935 06/05/24 1943   BP: (!) 113/51 (!) 140/58 (!) 148/60 (!) 152/73   Pulse: (!) 115 (!) 115 (!) 108 (!) 111   Resp: 23 15 16 17   Temp: 99 °F (37.2 °C) 99 °F (37.2 °C) 98.3 °F (36.8 °C) 98.5 °F (36.9 °C)   TempSrc:   Axillary Axillary   SpO2: 100% 100% 100% 100%     Predictive Model Details          60 (Warning)  Factor Value    Calculated 6/5/2024 19:57 26% Elvis coma scale 12    Deterioration Index Model 24% Age 89 years old     15% Neurological exam X     10% Hematocrit abnormal (15.0 %)     8% Pulse 111     8% Potassium 5.0 mmol/L     4% Systolic 152     2% Pulse oximetry 100 %     2% BUN abnormal (103 mg/dL)     1% Respiratory rate 17     1% Platelet count abnormal (22 K/uL)     0% Sodium 140 mmol/L     0% WBC count 5.9 K/uL     0% Temperature 98.5 °F (36.9 °C)       NPO? Yes  O2 Flow Rate: O2 Device: None (Room air)    Cardiac Rhythm: sinus  NIH Score: NIH     Active LDA's:   Peripheral IV 06/05/24 Posterior;Right Hand (Active)   Site Assessment Clean, dry & intact 06/05/24 1750       Peripheral IV 06/05/24 Left Antecubital (Active)   Site Assessment Clean, dry & intact 06/05/24 1937   Line Status Blood return noted 06/05/24 1937   Phlebitis Assessment No symptoms 06/05/24 1937   Infiltration Assessment 0 06/05/24 1937   Dressing Status New dressing applied;Clean, dry & intact 06/05/24 1937   Dressing Type Transparent 06/05/24 1937   Dressing Intervention New 06/05/24 1937     Pertinent or High Risk Medications/Drips:   If Yes, please provide details:     Blood Product Administration: yes  If Yes, please provide details: 1 unit in process  Sepsis Risk Score Sepsis Risk Score: 4.15    Admitted with Sepsis? No    Recommendation  Incomplete orders:   Sodium bicarb need hung

## 2024-06-06 NOTE — PROGRESS NOTES
Consult received.  This  spoke to the pts dtr per phone. She does want the pt to receive Hospice services in the SNF after dc.  It is ok to dc the pt when medically ready.  Please dc the pt with a 3 day supply of meds and care orders. Hospice will meet the pt and her dtr at the SNF for adm to Hospice.

## 2024-06-06 NOTE — DISCHARGE SUMMARY
oxyBUTYnin 5 MG tablet  Commonly known as: DITROPAN     QUEtiapine 100 MG tablet  Commonly known as: SEROQUEL     QUEtiapine 25 MG tablet  Commonly known as: SEROQUEL     senna 8.6 MG Tabs tablet  Commonly known as: SENOKOT     traMADol 50 MG tablet  Commonly known as: ULTRAM              Discharge Instructions:   Follow     Diet: Comfort feeds if patient desires    Disposition: Patient is medically stable and will be discharged back to SNF.    Time spent on discharge 35.    Signed:  JIMENA Wynn CNP  6/6/2024 11:56 AM

## 2024-06-06 NOTE — CONSULTS
Palliative Care Consult Note    2024 8:29 AM  Subjective:  Admit Date: 2024  PCP: Katheryn Salinas MD    Date of Service: 2024    Reason for Consultation:  Goals of Care, Code Status, Family Support     History Obtained From: EMR/Patient and their Family    History Of Present Illness:   The patient is a 89 y.o. female with PMH dementia, bilateral blindness, HTN, HLD, GERD, and other comorbidities who presented to [facility] ED from [facility] complaining of normal labs.  Patient initially presented to her PCP office the morning of admission with generalized weakness and lethargy. She was found to have severe anemia and thrombocytopenia.  On chart review it also appears patient had a normal white count of 56 several months ago.  She has not had any noted blood loss and it appears patient's mother  from aplastic anemia.  PCP had initiated discussions with family regarding concern for bone marrow process or aplastic anemia and that in order to fully treat this would need a bone marrow biopsy and aggressive measures. Patients daughter did not want to proceed with biopsy but transfusional support and limited workup was necessary at this point. The patient was evaluated first at [facility], where she was found to have hemoglobin of 4.9 and platelets of 18.  They attempted to transfuse her there, but she had antibodies in her blood and they did not have any blood available for transfusion that was safe for her.  There was going to be a 3-day wait to get blood so patient was transferred here for further treatment.  At baseline, patient has severe dementia and blindness.  She resides at [facility].  Family noted she has been more lethargic and with decreasing responsiveness over the last week.  She also reportedly had taken an antibiotic last week per daughter for upper respiratory tract infection.  She has had no fevers, cough, or shortness of breath  on day of admission.  On arrival  services, assistance with coordination of discharge with outpatient hospice, and placement of orders/preparation of this note: 81 minutes      Electronically signed by JIMENA Jimenez CNP on 6/6/2024 at 8:29 AM    (Please note that portions of this note were completed with a voice recognition program.  Efforts were made to edit the dictations but occasionally words are mis-transcribed.)

## 2024-06-06 NOTE — ACP (ADVANCE CARE PLANNING)
Advance Care Planning      Palliative Medicine Provider (MD/NP)  Advance Care Planning (ACP) Conversation      Date of Conversation: 06/06/24  The patient and/or authorized decision maker consented to a voluntary Advance Care Planning conversation.   Individuals present for the conversation:   Pts daughter over telephone    Legal Healthcare Agent(s):    Primary Decision Maker: Hailey Gaxiola - Child - [phone]    ACP documents available in EMR prior to discussion:  Financial POA on file    Primary Palliative Diagnosis(es):  Bicytopenia  DEANNA  Dementia    Conversation Summary: Reviewed POA documents on file which appear to be financial POA only listing pts daughter, Hailey, who is also legal NOK decision maker. Hailey confirms pt is to remain a DNR in the event of cardiac or respiratory arrest. Family has made the decision to pursue comfort measures with hospice service and patient returning to SNF today for outpatient hospice.    Resuscitation Status:    Code Status: DNR    I spent 9 minutes providing ACP services with the patient and/or surrogate decision maker in a voluntary, in-person conversation discussing the patient's wishes and goals as detailed in the above note during consult visit.       Curtis Hardwick, APRN - CNP

## 2024-06-06 NOTE — H&P
[facility]      Hospitalist - History & Physical      PCP: Katheryn Salinas MD    Date of Admission: 6/5/2024    Date of Service: 6/5/2024    Chief Complaint:  Abnormal labs    History Of Present Illness:   The patient is a 89 y.o. female who presented to [facility] ED for evaluation of abnormal labs. Pt has history of dementia, HTN, hyperlipidemia and blindness.    Pt is here with her daughter who provides history. Pt with severe anemia and thrombocytopenia at nursing home with recent discussion by pcp with family regarding concern for possible bone malignancy/blood dyscrasia. Pt with hemoglobin of 4.9 at outlying ED with platelets of 18k unable to transfusion due to blood antibodies. Pt had taken an antibiotic last week per daughter for upper respiratory tract infection. She has had no fevers, cough or shortness of breath today however has been much less responsive today.     In ED, 2 units prbc ordered. hgb 4.9, platelets 22k, wbc 5.9k, INR 1.33, sodium 140, potassium 5.0, creatinine 4.2, bun 103, co2 13, alt 44/ast 60. Pt is admitted inpatient to hospitalist.     Past Medical History:        Diagnosis Date    Decreased vision     Depression     GERD (gastroesophageal reflux disease)     Hallucination     Hypercholesteremia     Hypertension     Urge incontinence        Past Surgical History:        Procedure Laterality Date    SMALL INTESTINE SURGERY  2007    ANGÉLICA AND BSO (CERVIX REMOVED)      At age 20        Home Medications:  Prior to Admission medications    Medication Sig Start Date End Date Taking? Authorizing Provider   lisinopril (PRINIVIL;ZESTRIL) 20 MG tablet TAKE ONE TABLET BY MOUTH AT 0900 1/20/20   Yesi Forbes APRN - CNP   senna (SENOKOT) 8.6 MG TABS tablet TAKE ONE TABLET BY MOUTH AT 0900 and TAKE ONE TABLET AT 2100 1/6/20   Katheryn Salinas MD   metoprolol succinate (TOPROL XL) 100 MG extended release tablet TAKE ONE TABLET BY MOUTH AT 0900 8/5/19   Katheryn Salinas MD   esomeprazole  (NEXIUM) 40 MG delayed release capsule TAKE ONE CAPSULE BY MOUTH AT 0900 6/24/19   Katheryn Salinas MD   MYRBETRIQ 50 MG TB24 TAKE ONE TABLET BY MOUTH AT 0900 1/7/19   Katheryn Salinas MD   gabapentin (NEURONTIN) 300 MG capsule TAKE ONE CAPSULE BY MOUTH THREE TIMES DAILY FOR SHINGLES PAIN 3/7/18 6/6/18  Katheryn Salinas MD    CRANBERRY ULTRA STRENGTH 500 MG TABS TAKE ONE TABLET BY MOUTH AT 0800 2/5/18   Katheryn Salinas MD   QUEtiapine (SEROQUEL) 25 MG tablet TAKE 1/2 TABLET BY MOUTH EVERY MORNING 11/27/17   Katheryn Salinas MD   oxybutynin (DITROPAN) 5 MG tablet TAKE ONE TABLET BY MOUTH TWICE DAILY 5/1/17   Fabiola Olivares MD   traMADol (ULTRAM) 50 MG tablet Take 1 po BID PRN x 1 week and the 1 po q day PRN x 1 week 6/3/15   Katheryn Salinas MD   meclizine (ANTIVERT) 25 MG tablet Take 25 mg by mouth 3 times daily as needed.    ProviderNahum MD   atorvastatin (LIPITOR) 10 MG tablet Take 10 mg by mouth daily.    ProviderNahum MD   tolterodine (DETROL LA) 4 MG ER capsule Take 4 mg by mouth daily.    ProviderNahum MD   famotidine (PEPCID) 20 MG tablet Take 20 mg by mouth 2 times daily.    ProviderNahum MD   QUEtiapine (SEROQUEL) 100 MG tablet Take 50 mg by mouth 2 times daily.    ProviderNahum MD       Allergies:    Ampicillin    Social History:    The patient currently lives at nursing home  Tobacco:   reports that she does not have a smoking history on file. She has never used smokeless tobacco.  Alcohol:   reports no history of alcohol use.  Illicit Drugs: none    Family History:  History reviewed. No pertinent family history.    Review of Systems:   Review of Systems   Unable to perform ROS: Dementia        14 point review of systems is negative except as specifically addressed above.    Physical Examination:  BP (!) 148/60   Pulse (!) 108   Temp 98.3 °F (36.8 °C) (Axillary)   Resp 16   SpO2 100%   Physical Exam  Vitals reviewed.   Constitutional:       Appearance:

## 2024-06-07 ENCOUNTER — TELEPHONE (OUTPATIENT)
Dept: PRIMARY CARE CLINIC | Age: 89
End: 2024-06-07

## 2024-06-07 NOTE — TELEPHONE ENCOUNTER
Care Transitions Initial Follow Up Call    Outreach made within 2 business days of discharge: Yes    Patient: Cecily Amato Patient : 7/3/1929   MRN: [id number]  Reason for Admission:   Discharge Date: 24       Spoke with:     Discharge department/facility: [facility] Interactive Patient Contact:  Attempted to call patient \"subscriber not in service\"  Scheduled appointment with PCP within 7-14 days    Follow Up  No future appointments.    Alisha Pulido LPN

## 2024-06-10 NOTE — PROGRESS NOTES
Physician Progress Note      PATIENT:               REESE MONTOYA  CSN #:                  [id number]  :                       7/3/1929  ADMIT DATE:       2024 5:16 PM  DISCH DATE:        2024 2:31 PM  RESPONDING  PROVIDER #:        Jaren Kearney MD          QUERY TEXT:    Pt admitted with weakness and lethargy and has anemia documented. If possible,   please document in progress notes and discharge summary further specificity   regarding the acuity and type of anemia:]    The medical record reflects the following:  Risk Factors: HTN, GERD  Clinical Indicators: hgb 4.9, 8.5, Weakness and lethargy. Family wanted   initially transfusion support but no diagnostic workup to  find the cause of her cytopenias. Thrombocytopenia. Platelets 22, WBC 5.9.  Treatment:  Labs, Hospice, 4 units PRBC.    Thank you  Daija Martin RN, BSN, [facility]  [phone]  Options provided:  -- Anemia due to acute blood loss  -- Anemia due to chronic blood loss  -- Anemia due to acute on chronic blood loss  -- Anemia due to iron deficiency  -- Anemia due to ##, .  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

## 2024-06-11 DIAGNOSIS — R52 PAIN: Primary | ICD-10-CM

## 2024-06-11 DIAGNOSIS — R09.89 AIR HUNGER: ICD-10-CM

## 2024-06-11 RX ORDER — MORPHINE SULFATE 20 MG/5ML
2.5 SOLUTION ORAL
Qty: 15 ML | Refills: 0 | Status: SHIPPED | OUTPATIENT
Start: 2024-06-11 | End: 2024-06-14